# Patient Record
Sex: FEMALE | Race: OTHER | Employment: FULL TIME | URBAN - METROPOLITAN AREA
[De-identification: names, ages, dates, MRNs, and addresses within clinical notes are randomized per-mention and may not be internally consistent; named-entity substitution may affect disease eponyms.]

---

## 2019-05-05 ENCOUNTER — HOSPITAL ENCOUNTER (INPATIENT)
Age: 39
LOS: 3 days | Discharge: HOME | DRG: 744 | End: 2019-05-08
Attending: EMERGENCY MEDICINE | Admitting: INTERNAL MEDICINE

## 2019-05-05 DIAGNOSIS — D64.9 ANEMIA, UNSPECIFIED TYPE: Primary | ICD-10-CM

## 2019-05-05 DIAGNOSIS — N93.9 ABNORMAL UTERINE BLEEDING (AUB): ICD-10-CM

## 2019-05-05 DIAGNOSIS — D50.0 IRON DEFICIENCY ANEMIA DUE TO CHRONIC BLOOD LOSS: ICD-10-CM

## 2019-05-05 PROBLEM — N92.0 HEAVY MENSES: Status: ACTIVE | Noted: 2019-05-05

## 2019-05-05 PROBLEM — R73.9 HYPERGLYCEMIA: Status: ACTIVE | Noted: 2019-05-05

## 2019-05-05 PROBLEM — E66.01 MORBID OBESITY WITH BMI OF 50.0-59.9, ADULT (HCC): Status: ACTIVE | Noted: 2019-05-05

## 2019-05-05 LAB
-: ABNORMAL
ABSOLUTE EOS #: 0.28 K/UL (ref 0–0.4)
ABSOLUTE IMMATURE GRANULOCYTE: ABNORMAL K/UL (ref 0–0.3)
ABSOLUTE LYMPH #: 1.21 K/UL (ref 1–4.8)
ABSOLUTE MONO #: 0.47 K/UL (ref 0.1–1.3)
ALBUMIN SERPL-MCNC: 3.9 G/DL (ref 3.5–5.2)
ALBUMIN/GLOBULIN RATIO: ABNORMAL (ref 1–2.5)
ALP BLD-CCNC: 45 U/L (ref 35–104)
ALT SERPL-CCNC: 8 U/L (ref 5–33)
AMORPHOUS: ABNORMAL
ANION GAP SERPL CALCULATED.3IONS-SCNC: 13 MMOL/L (ref 9–17)
AST SERPL-CCNC: 13 U/L
BACTERIA: ABNORMAL
BASOPHILS # BLD: 1 % (ref 0–2)
BASOPHILS ABSOLUTE: 0.09 K/UL (ref 0–0.2)
BILIRUB SERPL-MCNC: 0.32 MG/DL (ref 0.3–1.2)
BILIRUBIN URINE: NEGATIVE
BUN BLDV-MCNC: 13 MG/DL (ref 6–20)
BUN/CREAT BLD: ABNORMAL (ref 9–20)
CALCIUM SERPL-MCNC: 8.3 MG/DL (ref 8.6–10.4)
CASTS UA: ABNORMAL /LPF
CHLORIDE BLD-SCNC: 100 MMOL/L (ref 98–107)
CO2: 24 MMOL/L (ref 20–31)
COLOR: ABNORMAL
COMMENT UA: ABNORMAL
CREAT SERPL-MCNC: 0.76 MG/DL (ref 0.5–0.9)
CRYSTALS, UA: ABNORMAL /HPF
DIFFERENTIAL TYPE: ABNORMAL
EOSINOPHILS RELATIVE PERCENT: 3 % (ref 0–4)
EPITHELIAL CELLS UA: ABNORMAL /HPF
ESTRADIOL LEVEL: 124 PG/ML (ref 27–314)
FERRITIN: 3 UG/L (ref 13–150)
FOLATE: 9.9 NG/ML
FOLLICLE STIMULATING HORMONE: 4.7 U/L (ref 1.7–21.5)
GFR AFRICAN AMERICAN: >60 ML/MIN
GFR NON-AFRICAN AMERICAN: >60 ML/MIN
GFR SERPL CREATININE-BSD FRML MDRD: ABNORMAL ML/MIN/{1.73_M2}
GFR SERPL CREATININE-BSD FRML MDRD: ABNORMAL ML/MIN/{1.73_M2}
GLUCOSE BLD-MCNC: 235 MG/DL (ref 70–99)
GLUCOSE URINE: NEGATIVE
HAPTOGLOBIN: 234 MG/DL (ref 30–200)
HCG QUALITATIVE: NEGATIVE
HCT VFR BLD CALC: 15.5 % (ref 36–46)
HCT VFR BLD CALC: 20.8 % (ref 36–46)
HEMOGLOBIN: 4.7 G/DL (ref 12–16)
HEMOGLOBIN: 6.2 G/DL (ref 12–16)
IGA: 79 MG/DL (ref 70–400)
IMMATURE GRANULOCYTES: ABNORMAL %
INR BLD: 1.1
IRON SATURATION: 4 % (ref 20–55)
IRON: 17 UG/DL (ref 37–145)
KETONES, URINE: NEGATIVE
LEUKOCYTE ESTERASE, URINE: ABNORMAL
LH: 5.7 U/L (ref 1–95.6)
LYMPHOCYTES # BLD: 13 % (ref 24–44)
MCH RBC QN AUTO: 18 PG (ref 26–34)
MCHC RBC AUTO-ENTMCNC: 30.3 G/DL (ref 31–37)
MCV RBC AUTO: 59.5 FL (ref 80–100)
MONOCYTES # BLD: 5 % (ref 1–7)
MORPHOLOGY: ABNORMAL
MUCUS: ABNORMAL
NITRITE, URINE: NEGATIVE
NRBC AUTOMATED: ABNORMAL PER 100 WBC
OTHER OBSERVATIONS UA: ABNORMAL
PDW BLD-RTO: 20.4 % (ref 11.5–14.9)
PH UA: 7.5 (ref 5–8)
PLATELET # BLD: 230 K/UL (ref 150–450)
PLATELET ESTIMATE: ABNORMAL
PMV BLD AUTO: 8.4 FL (ref 6–12)
POTASSIUM SERPL-SCNC: 4.2 MMOL/L (ref 3.7–5.3)
PROLACTIN: 17.67 UG/L (ref 4.79–23.3)
PROTEIN UA: ABNORMAL
PROTHROMBIN TIME: 14.7 SEC (ref 11.8–14.6)
RBC # BLD: 2.61 M/UL (ref 4–5.2)
RBC # BLD: ABNORMAL 10*6/UL
RBC UA: ABNORMAL /HPF
RENAL EPITHELIAL, UA: ABNORMAL /HPF
SEG NEUTROPHILS: 78 % (ref 36–66)
SEGMENTED NEUTROPHILS ABSOLUTE COUNT: 7.25 K/UL (ref 1.3–9.1)
SODIUM BLD-SCNC: 137 MMOL/L (ref 135–144)
SPECIFIC GRAVITY UA: 1.02 (ref 1–1.03)
T3 FREE: 2.27 PG/ML (ref 2.02–4.43)
TOTAL IRON BINDING CAPACITY: 406 UG/DL (ref 250–450)
TOTAL PROTEIN: 6.7 G/DL (ref 6.4–8.3)
TRICHOMONAS: ABNORMAL
TSH SERPL DL<=0.05 MIU/L-ACNC: 2.08 MIU/L (ref 0.3–5)
TURBIDITY: ABNORMAL
UNSATURATED IRON BINDING CAPACITY: 389 UG/DL (ref 112–347)
URINE HGB: ABNORMAL
UROBILINOGEN, URINE: NORMAL
VITAMIN B-12: 262 PG/ML (ref 232–1245)
WBC # BLD: 9.3 K/UL (ref 3.5–11)
WBC # BLD: ABNORMAL 10*3/UL
WBC UA: ABNORMAL /HPF
YEAST: ABNORMAL

## 2019-05-05 PROCEDURE — 84481 FREE ASSAY (FT-3): CPT

## 2019-05-05 PROCEDURE — 83036 HEMOGLOBIN GLYCOSYLATED A1C: CPT

## 2019-05-05 PROCEDURE — 82607 VITAMIN B-12: CPT

## 2019-05-05 PROCEDURE — 82728 ASSAY OF FERRITIN: CPT

## 2019-05-05 PROCEDURE — 99223 1ST HOSP IP/OBS HIGH 75: CPT | Performed by: INTERNAL MEDICINE

## 2019-05-05 PROCEDURE — P9016 RBC LEUKOCYTES REDUCED: HCPCS

## 2019-05-05 PROCEDURE — 81001 URINALYSIS AUTO W/SCOPE: CPT

## 2019-05-05 PROCEDURE — 85303 CLOT INHIBIT PROT C ACTIVITY: CPT

## 2019-05-05 PROCEDURE — 83002 ASSAY OF GONADOTROPIN (LH): CPT

## 2019-05-05 PROCEDURE — 80053 COMPREHEN METABOLIC PANEL: CPT

## 2019-05-05 PROCEDURE — 85025 COMPLETE CBC W/AUTO DIFF WBC: CPT

## 2019-05-05 PROCEDURE — 86920 COMPATIBILITY TEST SPIN: CPT

## 2019-05-05 PROCEDURE — 84146 ASSAY OF PROLACTIN: CPT

## 2019-05-05 PROCEDURE — 83010 ASSAY OF HAPTOGLOBIN QUANT: CPT

## 2019-05-05 PROCEDURE — 87591 N.GONORRHOEAE DNA AMP PROB: CPT

## 2019-05-05 PROCEDURE — 87086 URINE CULTURE/COLONY COUNT: CPT

## 2019-05-05 PROCEDURE — 86901 BLOOD TYPING SEROLOGIC RH(D): CPT

## 2019-05-05 PROCEDURE — 85610 PROTHROMBIN TIME: CPT

## 2019-05-05 PROCEDURE — 82746 ASSAY OF FOLIC ACID SERUM: CPT

## 2019-05-05 PROCEDURE — 83001 ASSAY OF GONADOTROPIN (FSH): CPT

## 2019-05-05 PROCEDURE — 87491 CHLMYD TRACH DNA AMP PROBE: CPT

## 2019-05-05 PROCEDURE — 36430 TRANSFUSION BLD/BLD COMPNT: CPT

## 2019-05-05 PROCEDURE — 83540 ASSAY OF IRON: CPT

## 2019-05-05 PROCEDURE — 85014 HEMATOCRIT: CPT

## 2019-05-05 PROCEDURE — 99285 EMERGENCY DEPT VISIT HI MDM: CPT

## 2019-05-05 PROCEDURE — 82784 ASSAY IGA/IGD/IGG/IGM EACH: CPT

## 2019-05-05 PROCEDURE — 85300 ANTITHROMBIN III ACTIVITY: CPT

## 2019-05-05 PROCEDURE — 84443 ASSAY THYROID STIM HORMONE: CPT

## 2019-05-05 PROCEDURE — 82670 ASSAY OF TOTAL ESTRADIOL: CPT

## 2019-05-05 PROCEDURE — 84703 CHORIONIC GONADOTROPIN ASSAY: CPT

## 2019-05-05 PROCEDURE — 83550 IRON BINDING TEST: CPT

## 2019-05-05 PROCEDURE — 86900 BLOOD TYPING SEROLOGIC ABO: CPT

## 2019-05-05 PROCEDURE — 83516 IMMUNOASSAY NONANTIBODY: CPT

## 2019-05-05 PROCEDURE — 85018 HEMOGLOBIN: CPT

## 2019-05-05 PROCEDURE — 36415 COLL VENOUS BLD VENIPUNCTURE: CPT

## 2019-05-05 PROCEDURE — 85306 CLOT INHIBIT PROT S FREE: CPT

## 2019-05-05 PROCEDURE — 2060000000 HC ICU INTERMEDIATE R&B

## 2019-05-05 PROCEDURE — 6370000000 HC RX 637 (ALT 250 FOR IP): Performed by: STUDENT IN AN ORGANIZED HEALTH CARE EDUCATION/TRAINING PROGRAM

## 2019-05-05 PROCEDURE — 2580000003 HC RX 258: Performed by: STUDENT IN AN ORGANIZED HEALTH CARE EDUCATION/TRAINING PROGRAM

## 2019-05-05 PROCEDURE — 86850 RBC ANTIBODY SCREEN: CPT

## 2019-05-05 RX ORDER — ONDANSETRON 2 MG/ML
4 INJECTION INTRAMUSCULAR; INTRAVENOUS EVERY 6 HOURS PRN
Status: DISCONTINUED | OUTPATIENT
Start: 2019-05-05 | End: 2019-05-08 | Stop reason: HOSPADM

## 2019-05-05 RX ORDER — SODIUM CHLORIDE 0.9 % (FLUSH) 0.9 %
10 SYRINGE (ML) INJECTION PRN
Status: DISCONTINUED | OUTPATIENT
Start: 2019-05-05 | End: 2019-05-08 | Stop reason: HOSPADM

## 2019-05-05 RX ORDER — SODIUM CHLORIDE 0.9 % (FLUSH) 0.9 %
10 SYRINGE (ML) INJECTION EVERY 12 HOURS SCHEDULED
Status: DISCONTINUED | OUTPATIENT
Start: 2019-05-05 | End: 2019-05-08 | Stop reason: HOSPADM

## 2019-05-05 RX ORDER — 0.9 % SODIUM CHLORIDE 0.9 %
250 INTRAVENOUS SOLUTION INTRAVENOUS ONCE
Status: DISCONTINUED | OUTPATIENT
Start: 2019-05-05 | End: 2019-05-08 | Stop reason: HOSPADM

## 2019-05-05 RX ORDER — POLYETHYLENE GLYCOL 3350 17 G/17G
17 POWDER, FOR SOLUTION ORAL DAILY PRN
Status: DISCONTINUED | OUTPATIENT
Start: 2019-05-05 | End: 2019-05-08 | Stop reason: HOSPADM

## 2019-05-05 RX ORDER — MAGNESIUM SULFATE 1 G/100ML
1 INJECTION INTRAVENOUS PRN
Status: DISCONTINUED | OUTPATIENT
Start: 2019-05-05 | End: 2019-05-08 | Stop reason: HOSPADM

## 2019-05-05 RX ORDER — POTASSIUM CHLORIDE 20 MEQ/1
40 TABLET, EXTENDED RELEASE ORAL PRN
Status: DISCONTINUED | OUTPATIENT
Start: 2019-05-05 | End: 2019-05-08 | Stop reason: HOSPADM

## 2019-05-05 RX ORDER — ACETAMINOPHEN 325 MG/1
650 TABLET ORAL EVERY 4 HOURS PRN
Status: DISCONTINUED | OUTPATIENT
Start: 2019-05-05 | End: 2019-05-08 | Stop reason: HOSPADM

## 2019-05-05 RX ORDER — FAMOTIDINE 20 MG/1
20 TABLET, FILM COATED ORAL 2 TIMES DAILY
Status: DISCONTINUED | OUTPATIENT
Start: 2019-05-05 | End: 2019-05-08 | Stop reason: HOSPADM

## 2019-05-05 RX ORDER — POTASSIUM CHLORIDE 7.45 MG/ML
10 INJECTION INTRAVENOUS PRN
Status: DISCONTINUED | OUTPATIENT
Start: 2019-05-05 | End: 2019-05-08 | Stop reason: HOSPADM

## 2019-05-05 RX ORDER — SODIUM CHLORIDE 9 MG/ML
INJECTION, SOLUTION INTRAVENOUS CONTINUOUS
Status: DISCONTINUED | OUTPATIENT
Start: 2019-05-05 | End: 2019-05-08

## 2019-05-05 RX ADMIN — ACETAMINOPHEN 650 MG: 325 TABLET, FILM COATED ORAL at 16:38

## 2019-05-05 RX ADMIN — SODIUM CHLORIDE: 9 INJECTION, SOLUTION INTRAVENOUS at 17:22

## 2019-05-05 RX ADMIN — FAMOTIDINE 20 MG: 20 TABLET ORAL at 16:38

## 2019-05-05 ASSESSMENT — PAIN SCALES - GENERAL
PAINLEVEL_OUTOF10: 1
PAINLEVEL_OUTOF10: 3

## 2019-05-05 ASSESSMENT — ENCOUNTER SYMPTOMS
CONSTIPATION: 0
DIARRHEA: 0
WHEEZING: 0
RESPIRATORY NEGATIVE: 1
EYES NEGATIVE: 1
GASTROINTESTINAL NEGATIVE: 1
NAUSEA: 0
ABDOMINAL PAIN: 0
SHORTNESS OF BREATH: 0
VOMITING: 0
BACK PAIN: 0
COUGH: 0

## 2019-05-05 NOTE — ED PROVIDER NOTES
EMERGENCY DEPARTMENT ENCOUNTER    Pt Name: Kathy Del Castillo  MRN: [id number]  [place] 1980  Date of evaluation: 5/5/19  CHIEF COMPLAINT       Chief Complaint   Patient presents with    Vaginal Bleeding     HISTORY OF PRESENT ILLNESS   The pt presents for evaluation of weakness, fatigue and shortness of breath. Started today. Pt states that she thinks she is a little pale. Pt states that she has been having irregular vaginal bleeding, heavy at times since Thursday. lmp was about a month ago. Pt has irregular periods. She does not see gyn. Pt denies any other bleeding, no changes in stool color or consistency. The history is provided by the patient. Fatigue   Severity:  Moderate  Onset quality:  Sudden  Duration:  1 day  Timing:  Constant  Progression:  Worsening  Chronicity:  New  Relieved by:  Nothing  Worsened by:  Nothing  Ineffective treatments:  None tried  Associated symptoms: no abdominal pain, no chest pain, no cough, no headaches and no shortness of breath        REVIEW OF SYSTEMS     Review of Systems   Constitutional: Positive for fatigue. HENT: Negative. Negative for congestion. Eyes: Negative. Respiratory: Negative. Negative for cough and shortness of breath. Cardiovascular: Negative. Negative for chest pain. Gastrointestinal: Negative. Negative for abdominal pain. Genitourinary: Positive for vaginal bleeding. Musculoskeletal: Negative. Negative for back pain. Skin: Positive for pallor. Neurological: Negative. Negative for headaches. All other systems reviewed and are negative. PASTMEDICAL HISTORY   History reviewed. No pertinent past medical history.   SURGICAL HISTORY       Past Surgical History:   Procedure Laterality Date    DILATION AND CURETTAGE OF UTERUS      x2     CURRENT MEDICATIONS       Current Discharge Medication List      CONTINUE these medications which have NOT CHANGED    Details   cephALEXin (KEFLEX) 500 MG capsule Take 1 capsule by mouth 3 times daily. Qty: 21 capsule, Refills: 0           ALLERGIES     has No Known Allergies. FAMILY HISTORY     has no family status information on file. SOCIAL HISTORY       Social History     Tobacco Use    Smoking status: Never Smoker    Smokeless tobacco: Never Used   Substance Use Topics    Alcohol use: Yes     Comment: occasional    Drug use: No     PHYSICAL EXAM     INITIAL VITALS: /60   Pulse 89   Temp 98 °F (36.7 °C) (Oral)   Resp 18   Ht 5' 2\" (1.575 m)   Wt 283 lb (128.4 kg)   LMP 05/01/2019   SpO2 100%   BMI 51.76 kg/m²    Physical Exam   Constitutional: She is oriented to person, place, and time. No distress. HENT:   Head: Normocephalic and atraumatic. Eyes: Conjunctivae are normal.   Neck: Normal range of motion. Neck supple. Cardiovascular: Normal rate, regular rhythm and normal heart sounds. No murmur heard. Pulmonary/Chest: Effort normal and breath sounds normal.   Abdominal: Soft. There is no tenderness. Genitourinary:   Genitourinary Comments: Chaperoned exam, scant blood no clots in vaginal canal.  Otherwise no abnormalities noted. Musculoskeletal: She exhibits no deformity. Neurological: She is alert and oriented to person, place, and time. Skin: Skin is warm and dry. She is not diaphoretic. There is pallor. Nursing note and vitals reviewed. MEDICAL DECISION MAKING:   Reviewed results. Significant symptomatic anemia. Type and cross of transfusion. On reeval, exam unchanged. Updated pt regarding results. Discussed with medicine and ob, will admit for further therapy and evaluation. Pt is ready for admission at this time.          CRITICAL CARE:       PROCEDURES:    Procedures    DIAGNOSTIC RESULTS   EKG:All EKG's are interpreted by the Emergency Department Physician who either signs or Co-signs this chart in the absence of a cardiologist.        RADIOLOGY:All plain film, CT, MRI, and formal ultrasound images (except ED bedside ultrasound) are read by the radiologist, see reports below, unless otherwisenoted in MDM or here. US NON OB TRANSVAGINAL    (Results Pending)     LABS: All lab results were reviewed by myself, and all abnormals are listed below.   Labs Reviewed   CBC WITH AUTO DIFFERENTIAL - Abnormal; Notable for the following components:       Result Value    RBC 2.61 (*)     Hemoglobin 4.7 (*)     Hematocrit 15.5 (*)     MCV 59.5 (*)     MCH 18.0 (*)     MCHC 30.3 (*)     RDW 20.4 (*)     Seg Neutrophils 78 (*)     Lymphocytes 13 (*)     All other components within normal limits   COMPREHENSIVE METABOLIC PANEL - Abnormal; Notable for the following components:    Glucose 235 (*)     Calcium 8.3 (*)     All other components within normal limits   PROTIME-INR - Abnormal; Notable for the following components:    Protime 14.7 (*)     All other components within normal limits   HCG, SERUM, QUALITATIVE   PERIPHERAL BLOOD SMEAR, PATH REVIEW   TSH WITHOUT REFLEX   HEMOGLOBIN AND HEMATOCRIT, BLOOD   ANDROSTENEDIONE   [id number] ACTIVITY   ANTITHROMBIN III ACTIVITY   FACTOR 8 RISTOCETIN COFACTOR   PROTEIN S FUNCTIONAL   PROTEIN C FUNCTIONAL   ESTROGENS, FRACTIONATED   ESTRADIOL   IRON AND TIBC   FERRITIN   FOLLICLE STIMULATING HORMONE   T3, FREE   HEMOGLOBIN A1C   LUTEINIZING HORMONE   VITAMIN B12 & FOLATE   GLIADIN DEAMINIDATED PEPTIDE AB IGA   GLIADIN DEAMINIDATED PEPTIDE AB IGG   IGA   PROLACTIN   TTIGA WITH REFLEX TO AEMAT   TISSUE TRANSGLUTAMINASE, IGG   HAPTOGLOBIN   TYPE AND SCREEN   TYPE AND SCREEN   PREPARE RBC (CROSSMATCH)       EMERGENCY DEPARTMENTCOURSE:         Vitals:    Vitals:    05/05/19 1522 05/05/19 1535 05/05/19 1538 05/05/19 1550   BP: (!) 140/54 (!) 117/56 (!) 117/56 135/60   Pulse: 91 93 93 89   Resp: 18 16  18   Temp: 97.8 °F (36.6 °C) 98 °F (36.7 °C)  98 °F (36.7 °C)   TempSrc: Oral Oral  Oral   SpO2: 100% 100%  100%   Weight:       Height:           The patient was given the following medications while in the emergency department:  Orders Placed This Encounter   Medications    0.9 % sodium chloride bolus    sodium chloride flush 0.9 % injection 10 mL    sodium chloride flush 0.9 % injection 10 mL    ondansetron (ZOFRAN) injection 4 mg    0.9 % sodium chloride infusion    OR Linked Order Group     potassium chloride (KLOR-CON M) extended release tablet 40 mEq     potassium bicarb-citric acid (EFFER-K) effervescent tablet 40 mEq     potassium chloride 10 mEq/100 mL IVPB (Peripheral Line)    magnesium sulfate 1 g in dextrose 5% 100 mL IVPB    polyethylene glycol (GLYCOLAX) packet 17 g    famotidine (PEPCID) tablet 20 mg    acetaminophen (TYLENOL) tablet 650 mg    iron sucrose (VENOFER) 200 mg in sodium chloride 0.9 % 100 mL IVPB     CONSULTS:  IP CONSULT TO OB GYN  IP CONSULT TO INTERNAL MEDICINE  IP CONSULT TO SOCIAL WORK  IP CONSULT TO ONCOLOGY    FINAL IMPRESSION      1.  Anemia, unspecified type          DISPOSITION/PLAN   DISPOSITION Admitted 05/05/2019 01:04:57 PM      PATIENT REFERRED TO:  Mike Mccord MD  [address] [place] [address]  [phone]    In 1 week  post hospital admission    [address] [place] [address]  [phone]  In 1 week  F/U visit following hospital admission for AUB    DISCHARGE MEDICATIONS:  Current Discharge Medication List        Shaila Loco MD  Attending Emergency Physician                    Murtaza Flower MD  05/05/19 2832

## 2019-05-05 NOTE — H&P
History and Physical Service  [facility] Internal Medicine    HISTORY AND PHYSICAL EXAMINATION            Date:   5/5/2019  Patient name:  Lucia Grier  MRN:   [id number]  Account:  [de-identified]  YOB: 1980  PCP:    No primary care provider on file. Code Status:    Full Code    Chief Complaint:     Fatigue and dyspnea    History Obtained From:     patient    History of Present Illness: The patient is a 41 y.o. Non-/non  female who presents with the increasing fatigue and dyspnea for last few days denies any chest pain positive for increased menstrual bleed 3 days. The first days heavy with the 12 pads second and third days 3 parents denied any lower GI bleed no new medications no travel history   No associated  diarrhea or weight loss         Past Medical History:     History reviewed. No pertinent past medical history. Past Surgical History:     Past Surgical History:   Procedure Laterality Date    DILATION AND CURETTAGE OF UTERUS      x2        Medications Prior to Admission:     Prior to Admission medications    Medication Sig Start Date End Date Taking? Authorizing Provider   cephALEXin (KEFLEX) 500 MG capsule Take 1 capsule by mouth 3 times daily. 12/2/14   Altagracia Leblanc MD        Allergies:     Patient has no known allergies. Social History:     Tobacco:    reports that she has never smoked. She has never used smokeless tobacco.  Alcohol:      reports that she drinks alcohol. Drug Use:  reports that she does not use drugs. Family History:     History reviewed. No pertinent family history. Review of Systems:     Positive and Negative as described in HPI.     CONSTITUTIONAL:  negative for fevers, chills, sweats, fatigue, weight loss  HEENT:  negative for vision, hearing changes, runny nose, throat pain  RESPIRATORY: Dyspnea on exertion  CARDIOVASCULAR:  T and dyspnea on exertion  GASTROINTESTINAL:  negative for nausea, vomiting, diarrhea, constipation, change in bowel habits, abdominal pain   GENITOURINARY:  negative for difficulty of urination, burning with urination, frequency   INTEGUMENT:  negative for rash, skin lesions, easy bruising   HEMATOLOGIC/LYMPHATIC:  negative for swelling/edema   ALLERGIC/IMMUNOLOGIC:  negative for urticaria , itching  ENDOCRINE:  negative increase in drinking, increase in urination, hot or cold intolerance  MUSCULOSKELETAL:  negative joint pains, muscle aches, swelling of joints  NEUROLOGICAL:  negative for headaches, dizziness, lightheadedness, numbness, pain, tingling extremities  BEHAVIOR/PSYCH:  negative for depression, anxiety  Positive for heavy menstrual bleed  Physical Exam:   BP (!) 146/63   Pulse 91   Temp 97.6 °F (36.4 °C) (Oral)   Resp 18   Ht 5' 2\" (1.575 m)   Wt 283 lb (128.4 kg)   LMP 05/01/2019   SpO2 100%   BMI 51.76 kg/m²   No results for input(s): POCGLU in the last 72 hours. Morbid obese clinically looks pale  General Appearance:  alert, well appearing, and in no acute distress  Mental status: oriented to person, place, and time with normal affect  Head:  normocephalic, atraumatic. Eye: no icterus, redness, pupils equal and reactive, extraocular eye movements intact, conjunctiva clear  Ear: normal external ear, no discharge, hearing intact  Nose:  no drainage noted  Mouth: mucous membranes moist  Neck: supple, no carotid bruits, thyroid not palpable  Lungs: Bilateral equal air entry, clear to ausculation, no wheezing, rales or rhonchi, normal effort  Cardiovascular: normal rate, regular rhythm, no murmur, gallop, rub.   Abdomen: Soft, nontender, nondistended, normal bowel sounds, no hepatomegaly or splenomegaly  Neurologic: There are no new focal motor or sensory deficits, normal muscle tone and bulk, no abnormal sensation, normal speech, cranial nerves II through XII grossly intact  Skin: No gross lesions, rashes, bruising or bleeding on exposed skin area  Extremities:  peripheral pulses palpable, no pedal edema or calf pain with palpation  Psych: normal affect     Investigations:      Laboratory Testing:  Recent Results (from the past 24 hour(s))   CBC Auto Differential    Collection Time: 05/05/19 11:39 AM   Result Value Ref Range    WBC 9.3 3.5 - 11.0 k/uL    RBC 2.61 (L) 4.0 - 5.2 m/uL    Hemoglobin 4.7 (LL) 12.0 - 16.0 g/dL    Hematocrit 15.5 (L) 36 - 46 %    MCV 59.5 (L) 80 - 100 fL    MCH 18.0 (L) 26 - 34 pg    MCHC 30.3 (L) 31 - 37 g/dL    RDW 20.4 (H) 11.5 - 14.9 %    Platelets 967 750 - 541 k/uL    MPV 8.4 6.0 - 12.0 fL    NRBC Automated NOT REPORTED per 100 WBC    Differential Type NOT REPORTED     Immature Granulocytes NOT REPORTED 0 %    Absolute Immature Granulocyte NOT REPORTED 0.00 - 0.30 k/uL    WBC Morphology NOT REPORTED     RBC Morphology NOT REPORTED     Platelet Estimate NOT REPORTED     Seg Neutrophils 78 (H) 36 - 66 %    Lymphocytes 13 (L) 24 - 44 %    Monocytes 5 1 - 7 %    Eosinophils % 3 0 - 4 %    Basophils 1 0 - 2 %    Segs Absolute 7.25 1.3 - 9.1 k/uL    Absolute Lymph # 1.21 1.0 - 4.8 k/uL    Absolute Mono # 0.47 0.1 - 1.3 k/uL    Absolute Eos # 0.28 0.0 - 0.4 k/uL    Basophils # 0.09 0.0 - 0.2 k/uL    Morphology ANISOCYTOSIS PRESENT     Morphology HYPOCHROMIA PRESENT     Morphology MICROCYTOSIS PRESENT     Morphology 1+ ELLIPTOCYTES     Morphology 1+ POLYCHROMASIA    Comprehensive Metabolic Panel    Collection Time: 05/05/19 11:39 AM   Result Value Ref Range    Glucose 235 (H) 70 - 99 mg/dL    BUN 13 6 - 20 mg/dL    CREATININE 0.76 0.50 - 0.90 mg/dL    Bun/Cre Ratio NOT REPORTED 9 - 20    Calcium 8.3 (L) 8.6 - 10.4 mg/dL    Sodium 137 135 - 144 mmol/L    Potassium 4.2 3.7 - 5.3 mmol/L    Chloride 100 98 - 107 mmol/L    CO2 24 20 - 31 mmol/L    Anion Gap 13 9 - 17 mmol/L    Alkaline Phosphatase 45 35 - 104 U/L    ALT 8 5 - 33 U/L    AST 13 <32 U/L    Total Bilirubin 0.32 0.3 - 1.2 mg/dL    Total Protein 6.7 6.4 - 8.3 g/dL    Alb 3.9 3.5 - 5.2 g/dL    Albumin/Globulin Ratio NOT REPORTED 1.0 - 2.5    GFR Non-African American >60 >60 mL/min    GFR African American >60 >60 mL/min    GFR Comment          GFR Staging NOT REPORTED    HCG Qualitative, Serum    Collection Time: 05/05/19 11:39 AM   Result Value Ref Range    hCG Qual NEGATIVE NEGATIVE   TYPE AND SCREEN    Collection Time: 05/05/19 11:39 AM   Result Value Ref Range    Expiration Date 05/08/2019     Arm Band Number [id number]     ABO/Rh A POSITIVE     Antibody Screen NEGATIVE     Blood Bank Comment ABORH CONFIRMED AS A POS: 403  Results Verified       Unit Number [id number]     Product Code Leukocyte Reduced Red Cell     Unit Divison 00     Dispense Status ALLOCATED     Transfusion Status OK TO TRANSFUSE     Crossmatch Result COMPATIBLE     Unit Number [id number]     Product Code Leukocyte Reduced Red Cell     Unit Divison 00     Dispense Status ISSUED     Transfusion Status OK TO TRANSFUSE     Crossmatch Result COMPATIBLE    Protime-INR    Collection Time: 05/05/19 11:39 AM   Result Value Ref Range    Protime 14.7 (H) 11.8 - 14.6 sec    INR 1.1    Path Review, Smear    Collection Time: 05/05/19 11:39 AM   Result Value Ref Range    Pathologist Review TO BE REVIEWED BY PATHOLOGIST        Recent Labs     05/05/19  1139   HGB 4.7*   HCT 15.5*   WBC 9.3   MCV 59.5*      K 4.2      CO2 24   BUN 13   CREATININE 0.76   GLUCOSE 235*   INR 1.1   PROTIME 14.7*   AST 13   ALT 8   LABALBU 3.9       Hematology:  Recent Labs     05/05/19  1139   WBC 9.3   RBC 2.61*   HGB 4.7*   HCT 15.5*   MCV 59.5*   MCH 18.0*   MCHC 30.3*   RDW 20.4*      MPV 8.4   INR 1.1     Chemistry:  Recent Labs     05/05/19  1139      K 4.2      CO2 24   GLUCOSE 235*   BUN 13   CREATININE 0.76   ANIONGAP 13   LABGLOM >60   GFRAA >60   CALCIUM 8.3*     Recent Labs     05/05/19  1139   PROT 6.7   LABALBU 3.9   AST 13   ALT 8   ALKPHOS 45   BILITOT 0.32       Imaging/Diagnostics:       No results found.          Impressions :      1. Active Problems:    Anemia    Morbid obesity with BMI of 50.0-59.9, adult (HCC)    Abnormal uterine bleeding (AUB)    Heavy menses    Hyperglycemia  Resolved Problems:    * No resolved hospital problems. *        2.  has no past medical history on file.      Plans:     Severe microcytic anemia with MCV of 59 likely for menorrhagia  Will do iron deficiency workup will do a celiac panel hemolytic anemia workup ObGyn consult  IV Venofer 200 mg once a day for 5 doses      Current Facility-Administered Medications   Medication Dose Route Frequency Provider Last Rate Last Dose    0.9 % sodium chloride bolus  250 mL Intravenous Once Ilda George MD        sodium chloride flush 0.9 % injection 10 mL  10 mL Intravenous 2 times per day Fabiano Daniels MD        sodium chloride flush 0.9 % injection 10 mL  10 mL Intravenous PRN Fabiano Daniels MD        ondansetron (ZOFRAN) injection 4 mg  4 mg Intravenous Q6H PRN Fabiano Daniels MD        0.9 % sodium chloride infusion   Intravenous Continuous Fabiano Daniels MD        potassium chloride (KLOR-CON M) extended release tablet 40 mEq  40 mEq Oral PRN Fabiano Daniels MD        Or    potassium bicarb-citric acid (EFFER-K) effervescent tablet 40 mEq  40 mEq Oral PRN Fbaiano Daniels MD        Or    potassium chloride 10 mEq/100 mL IVPB (Peripheral Line)  10 mEq Intravenous PRN Fabiano Daniels MD        magnesium sulfate 1 g in dextrose 5% 100 mL IVPB  1 g Intravenous PRN Fabiano Daniels MD        polyethylene glycol (GLYCOLAX) packet 17 g  17 g Oral Daily PRN Fabiano Daniels MD        famotidine (PEPCID) tablet 20 mg  20 mg Oral BID Fabiano Daniels MD        acetaminophen (TYLENOL) tablet 650 mg  650 mg Oral Q4H PRN Fabiano Daniels MD        iron sucrose (VENOFER) 200 mg in sodium chloride 0.9 % 100 mL IVPB  200 mg Intravenous Q24H MD Jackson Randall MD  5/5/2019  2:52 PM

## 2019-05-05 NOTE — LETTER
[address]  Phone: [phone]    Dr. Deep Loaiza      May 8, 2019     Patient: Royer Landry   YOB: 1980   Date of Visit: 5/5/2019       To Whom It May Concern:    Mag Van was admitted to the hospital from 5/5/2019 to 5/8/2019 to treat her medical condition. It is my medical opinion that Mag Van may return to full duty immediately with no restrictions. If you have any questions or concerns, please don't hesitate to call.     Sincerely,          Electronically signed by Deep Loaiza MD on 5/8/2019 at 1:52 PM

## 2019-05-05 NOTE — PROGRESS NOTES
Tan Fracisco   1980    Hgb 6.2 s/p 2u PRBC. Additional 1u of PRBC ordered.     Electronically signed by Yaquelin Ortiz MD on 2019 at 6:46 PM

## 2019-05-05 NOTE — PROGRESS NOTES
Patient to 2088 from ED. Patient attached to portable cardiac monitor. Assessment complete, vital signs obtained. First unit of PRBCs currently infusing into L AC IV. Patient oriented to surroundings and use of call system. No signs of bleeding currently. Patient alert/oriented and appropriate. Updated on plan of care. Denies further questions at this time.

## 2019-05-05 NOTE — CONSULTS
[address]    Patient Name: Tip Germain     Patient : 1980  Room/Bed:   Admission Date/Time: 2019 11:11 AM  Primary Care Physician: No primary care provider on file. Consulting Provider: Dr. Indu Green  Reason for Consult: Abnormal Uterine    CC:   Chief Complaint   Patient presents with    Vaginal Bleeding                HPI: Tip Germain is a 41 y.o. female [id number] presents with lightheadedness, dizziness and vaginal bleeding earlier today. Reports felt she had to use the restroom to void, went to the restroom and had active vaginal bleeding that \"filled the whole toilet\" around 9:00 am. She reports Hx of menses at age 13 with irregular menses at first, Hx of TAB x2 requiring D&C and regular menses since birth of her son at age 25. Reports normal  with no issues or bleeding problems. She reports regular menses every month that last 2-3 days with heavy bleeding for \"about half a day\" on day one of menses. She has always had heavy menses. Denies any previous symptoms or workup previously. She has no regular PCP or GYN care. She reports seeing a doctor \"for a pap smear 3 or 4 years ago\" and reports it was negative because she never got a call otherwise. Patient's last menstrual period was 2019. Hgb 4.7 on admission with no active bleeding on pelvic exam per ED attending. She was very pale (\"as white as paper\" on admission (per ED attending) and so lightheaded that she was unable to tolerate orthostatics. Patient admitted to Medicine for acute anemia workup and treatment. OB consulted for AUB. Patient receiving unit of blood when evaluated by myself. Pelvic exam performed revealing enlarged fibroid uterus with no active bleeding on my exam and no blood on gloves. Patient able to sit up, remove underwear for pelvic exam, reporting feeling better since start of blood transfusion.     She denies PMHx of DM, Thyroid issues or family hx of Medications   Medication Dose Route Frequency Provider Last Rate Last Dose    0.9 % sodium chloride bolus  250 mL Intravenous Once Fabio New MD         Current Outpatient Medications   Medication Sig Dispense Refill    cephALEXin (KEFLEX) 500 MG capsule Take 1 capsule by mouth 3 times daily. 21 capsule 0       FAMILY HISTORY:  Family History of Breast, Ovarian, Colon or Uterine Cancer: No   family history is not on file. SOCIAL HISTORY:   reports that she has never smoked. She has never used smokeless tobacco. She reports that she drinks alcohol. She reports that she does not use drugs. HEALTH MAINTENANCE:  Date of Last Mammogram: Patient has not had mammogram  Date of Last Colonoscopy: Patient has not had  Date of Last Bone Density: Patient has not had  ________________________________________________________________________                                    VITALS:  Vitals:    05/05/19 1301 05/05/19 1306 05/05/19 1311 05/05/19 1326   BP: (!) 118/53 (!) 112/51 (!) 117/52 (!) 113/53   Pulse: 94 91 94 89   Resp: 16 16 16 18   Temp: 98.4 °F (36.9 °C) 98.4 °F (36.9 °C) 98.4 °F (36.9 °C) 98.6 °F (37 °C)   TempSrc: Oral Oral Oral Oral   SpO2: 100% 100% 100% 100%   Weight:       Height:                                                        INPUT/OUTPUT:  No intake/output data recorded. No intake/output data recorded. PHYSICAL EXAM:     General Appearance: Appears pale, morbidly obese, Alert; in no acute distress. Pleasant. Skin: Skin color pale, normal texture. No rashes or lesions. Conjunctival pallor and pale hands  Lymphatic: No abnormally enlarged lymph nodes. Neck and EENT: normal atraumatic, no neck masses, normal thyroid, no jvd  Respiratory: Normal expansion. Clear to auscultation. No rales, rhonchi, or wheezing.  Speaking in full sentences  Cardiovascular: regular rate and rhythm, no murmurs rubs or gallops, prolonged capillary refill >2 sec  Abdomen: obese, soft, non-tender, non-distended, no right upper quadrant tenderness and no CVA tenderness, no abdominal scars  Pelvic Exam:   External genitalia: General appearance; normal, Hair distribution; normal, Lesions absent  Urinary system: urethral meatus normal  Vaginal: normal mucosa, no discharge or bleeding at introitus  Cervix: normal appearing cervix without discharge or lesions  Adnexa: adnexa not palpable secondary to body habitus, nontender and no masses  Uterus: mid-position, enlarged 16-18 wk sized uterus, suspicious for fibroids  Rectal Exam: exam declined by patient  Musculoskeletal: Spine range of motion normal. Muscular strength intact. , Range of motion normal in hips, knees, shoulders, and spine., No joint swelling, deformity, or tenderness. , no gross abnormalities  Extremities: Warm and well perfused, non-tender BLE and non-edematous,   Psych:  oriented to time, place and person, mood and affect are within normal limits, pt is a good historian; no memory problems were noted     OMM EXAM:  The patient did not complain of a Chief complaint requiring OMM.   Chief Complaint: none    Structural Exam: No Interest    LAB RESULTS:  Results for orders placed or performed during the hospital encounter of 05/05/19   CBC Auto Differential   Result Value Ref Range    WBC 9.3 3.5 - 11.0 k/uL    RBC 2.61 (L) 4.0 - 5.2 m/uL    Hemoglobin 4.7 (LL) 12.0 - 16.0 g/dL    Hematocrit 15.5 (L) 36 - 46 %    MCV 59.5 (L) 80 - 100 fL    MCH 18.0 (L) 26 - 34 pg    MCHC 30.3 (L) 31 - 37 g/dL    RDW 20.4 (H) 11.5 - 14.9 %    Platelets 290 519 - 154 k/uL    MPV 8.4 6.0 - 12.0 fL    NRBC Automated NOT REPORTED per 100 WBC    Differential Type NOT REPORTED     Immature Granulocytes NOT REPORTED 0 %    Absolute Immature Granulocyte NOT REPORTED 0.00 - 0.30 k/uL    WBC Morphology NOT REPORTED     RBC Morphology NOT REPORTED     Platelet Estimate NOT REPORTED     Seg Neutrophils 78 (H) 36 - 66 %    Lymphocytes 13 (L) 24 - 44 %    Monocytes 5 1 - 7 %    Eosinophils % 3 0 - 4 %    Basophils 1 0 - 2 %    Segs Absolute 7.25 1.3 - 9.1 k/uL    Absolute Lymph # 1.21 1.0 - 4.8 k/uL    Absolute Mono # 0.47 0.1 - 1.3 k/uL    Absolute Eos # 0.28 0.0 - 0.4 k/uL    Basophils # 0.09 0.0 - 0.2 k/uL    Morphology ANISOCYTOSIS PRESENT     Morphology HYPOCHROMIA PRESENT     Morphology MICROCYTOSIS PRESENT     Morphology 1+ ELLIPTOCYTES     Morphology 1+ POLYCHROMASIA    Comprehensive Metabolic Panel   Result Value Ref Range    Glucose 235 (H) 70 - 99 mg/dL    BUN 13 6 - 20 mg/dL    CREATININE 0.76 0.50 - 0.90 mg/dL    Bun/Cre Ratio NOT REPORTED 9 - 20    Calcium 8.3 (L) 8.6 - 10.4 mg/dL    Sodium 137 135 - 144 mmol/L    Potassium 4.2 3.7 - 5.3 mmol/L    Chloride 100 98 - 107 mmol/L    CO2 24 20 - 31 mmol/L    Anion Gap 13 9 - 17 mmol/L    Alkaline Phosphatase 45 35 - 104 U/L    ALT 8 5 - 33 U/L    AST 13 <32 U/L    Total Bilirubin 0.32 0.3 - 1.2 mg/dL    Total Protein 6.7 6.4 - 8.3 g/dL    Alb 3.9 3.5 - 5.2 g/dL    Albumin/Globulin Ratio NOT REPORTED 1.0 - 2.5    GFR Non-African American >60 >60 mL/min    GFR African American >60 >60 mL/min    GFR Comment          GFR Staging NOT REPORTED    HCG Qualitative, Serum   Result Value Ref Range    hCG Qual NEGATIVE NEGATIVE   Protime-INR   Result Value Ref Range    Protime 14.7 (H) 11.8 - 14.6 sec    INR 1.1    Path Review, Smear   Result Value Ref Range    Pathologist Review TO BE REVIEWED BY PATHOLOGIST    TYPE AND SCREEN   Result Value Ref Range    Expiration Date 05/08/2019     Arm Band Number [id number]     ABO/Rh A POSITIVE     Antibody Screen NEGATIVE     Blood Bank Comment ABORH CONFIRMED AS A POS: 403  Results Verified       Unit Number [id number]     Product Code Leukocyte Reduced Red Cell     Unit Divison 00     Dispense Status ALLOCATED     Transfusion Status OK TO TRANSFUSE     Crossmatch Result COMPATIBLE     Unit Number [id number]     Product Code Leukocyte Reduced Red Cell     Unit Divison 00     Dispense Status ISSUED     Transfusion Status OK TO TRANSFUSE     Crossmatch Result COMPATIBLE        DIAGNOSTICS:    No results found. ASSESSMENT & PLAN:    Parth Byrne is a 41 y.o. female [id number] with AUB and acute on chronic blood loss anemia  - VSS  - Hgb 4.7 on admission  - Admitted to Medicine with OB on as consult  - No bleeding on pelvic exam per ED attending, no blood on glove on bimanual exam  - Blood transfusion initiated  - Labs per primary, agree with current ordered  - TVUS ordered to evaluate enlarged uterus    Enlarged uterus  - TVUS ordered    Hyperglycemia  - Glucose 235 on admission  - HgbA1C ordered    Morbid obesity (BMI 51)    Patient admitted to Medicine for acute anemia workup and treatment. OB consulted for AUB. Patient receiving unit of blood when evaluated by myself. Pelvic exam performed revealing enlarged fibroid uterus. Patient able to sit up, remove underwear for pelvic exam, reporting feeling better since start of blood transfusion. Patient will need to follow up closely with GYN for AUB and potential medical versus surgical management        Patient Active Problem List    Diagnosis Date Noted    Anemia 05/05/2019       Plan discussed with Dr. Aimee Martinez, who is agreeable.      Attending's Name: Dr. Zelda Hernández DO  Ob/Gyn Resident  Pager: [phone]  5/5/2019, 1:32 PM

## 2019-05-06 ENCOUNTER — APPOINTMENT (OUTPATIENT)
Dept: ULTRASOUND IMAGING | Age: 39
DRG: 744 | End: 2019-05-06

## 2019-05-06 LAB
ABSOLUTE EOS #: 0.29 K/UL (ref 0–0.4)
ABSOLUTE IMMATURE GRANULOCYTE: ABNORMAL K/UL (ref 0–0.3)
ABSOLUTE LYMPH #: 2.25 K/UL (ref 1–4.8)
ABSOLUTE MONO #: 0.59 K/UL (ref 0.1–1.3)
ANION GAP SERPL CALCULATED.3IONS-SCNC: 11 MMOL/L (ref 9–17)
BASOPHILS # BLD: 1 % (ref 0–2)
BASOPHILS ABSOLUTE: 0.1 K/UL (ref 0–0.2)
BUN BLDV-MCNC: 9 MG/DL (ref 6–20)
BUN/CREAT BLD: ABNORMAL (ref 9–20)
C. TRACHOMATIS DNA ,URINE: NEGATIVE
CALCIUM SERPL-MCNC: 8.4 MG/DL (ref 8.6–10.4)
CHLORIDE BLD-SCNC: 104 MMOL/L (ref 98–107)
CO2: 24 MMOL/L (ref 20–31)
CREAT SERPL-MCNC: 0.81 MG/DL (ref 0.5–0.9)
CULTURE: NORMAL
DIFFERENTIAL TYPE: ABNORMAL
EOSINOPHILS RELATIVE PERCENT: 3 % (ref 0–4)
ESTIMATED AVERAGE GLUCOSE: 154 MG/DL
GFR AFRICAN AMERICAN: >60 ML/MIN
GFR NON-AFRICAN AMERICAN: >60 ML/MIN
GFR SERPL CREATININE-BSD FRML MDRD: ABNORMAL ML/MIN/{1.73_M2}
GFR SERPL CREATININE-BSD FRML MDRD: ABNORMAL ML/MIN/{1.73_M2}
GLIADIN DEAMINIDATED PEPTIDE AB IGA: 0.5 U/ML
GLIADIN DEAMINIDATED PEPTIDE AB IGG: <0.4 U/ML
GLUCOSE BLD-MCNC: 135 MG/DL (ref 70–99)
HBA1C MFR BLD: 7 % (ref 4–6)
HCT VFR BLD CALC: 22.5 % (ref 36–46)
HCT VFR BLD CALC: 25 % (ref 36–46)
HCT VFR BLD CALC: 25.5 % (ref 36–46)
HEMOGLOBIN: 6.9 G/DL (ref 12–16)
HEMOGLOBIN: 7.6 G/DL (ref 12–16)
HEMOGLOBIN: 8.1 G/DL (ref 12–16)
IMMATURE GRANULOCYTES: ABNORMAL %
LYMPHOCYTES # BLD: 23 % (ref 24–44)
Lab: NORMAL
MCH RBC QN AUTO: 22 PG (ref 26–34)
MCHC RBC AUTO-ENTMCNC: 31.6 G/DL (ref 31–37)
MCV RBC AUTO: 69.4 FL (ref 80–100)
MONOCYTES # BLD: 6 % (ref 1–7)
MORPHOLOGY: ABNORMAL
N. GONORRHOEAE DNA, URINE: NEGATIVE
NRBC AUTOMATED: ABNORMAL PER 100 WBC
PATHOLOGIST REVIEW: NORMAL
PDW BLD-RTO: 28 % (ref 11.5–14.9)
PLATELET # BLD: 194 K/UL (ref 150–450)
PLATELET ESTIMATE: ABNORMAL
PMV BLD AUTO: 8.5 FL (ref 6–12)
POTASSIUM SERPL-SCNC: 4.5 MMOL/L (ref 3.7–5.3)
RBC # BLD: 3.67 M/UL (ref 4–5.2)
RBC # BLD: ABNORMAL 10*6/UL
SEG NEUTROPHILS: 67 % (ref 36–66)
SEGMENTED NEUTROPHILS ABSOLUTE COUNT: 6.57 K/UL (ref 1.3–9.1)
SODIUM BLD-SCNC: 139 MMOL/L (ref 135–144)
SPECIMEN DESCRIPTION: NORMAL
SPECIMEN DESCRIPTION: NORMAL
THYROXINE, FREE: 1.14 NG/DL (ref 0.93–1.7)
TISSUE TRANSGLUTAMINASE ANTIBODY IGG: <0.6 U/ML
TISSUE TRANSGLUTAMINASE IGA: 0.1 U/ML
WBC # BLD: 9.8 K/UL (ref 3.5–11)
WBC # BLD: ABNORMAL 10*3/UL

## 2019-05-06 PROCEDURE — 86900 BLOOD TYPING SEROLOGIC ABO: CPT

## 2019-05-06 PROCEDURE — 86038 ANTINUCLEAR ANTIBODIES: CPT

## 2019-05-06 PROCEDURE — 6370000000 HC RX 637 (ALT 250 FOR IP): Performed by: STUDENT IN AN ORGANIZED HEALTH CARE EDUCATION/TRAINING PROGRAM

## 2019-05-06 PROCEDURE — P9016 RBC LEUKOCYTES REDUCED: HCPCS

## 2019-05-06 PROCEDURE — 36430 TRANSFUSION BLD/BLD COMPNT: CPT

## 2019-05-06 PROCEDURE — 82157 ASSAY OF ANDROSTENEDIONE: CPT

## 2019-05-06 PROCEDURE — 36415 COLL VENOUS BLD VENIPUNCTURE: CPT

## 2019-05-06 PROCEDURE — 85018 HEMOGLOBIN: CPT

## 2019-05-06 PROCEDURE — 80048 BASIC METABOLIC PNL TOTAL CA: CPT

## 2019-05-06 PROCEDURE — 84439 ASSAY OF FREE THYROXINE: CPT

## 2019-05-06 PROCEDURE — 76830 TRANSVAGINAL US NON-OB: CPT

## 2019-05-06 PROCEDURE — 2580000003 HC RX 258: Performed by: STUDENT IN AN ORGANIZED HEALTH CARE EDUCATION/TRAINING PROGRAM

## 2019-05-06 PROCEDURE — 99999 PR OFFICE/OUTPT VISIT,PROCEDURE ONLY: CPT | Performed by: INTERNAL MEDICINE

## 2019-05-06 PROCEDURE — 99254 IP/OBS CNSLTJ NEW/EST MOD 60: CPT | Performed by: INTERNAL MEDICINE

## 2019-05-06 PROCEDURE — 6360000002 HC RX W HCPCS: Performed by: STUDENT IN AN ORGANIZED HEALTH CARE EDUCATION/TRAINING PROGRAM

## 2019-05-06 PROCEDURE — 85245 CLOT FACTOR VIII VW RISTOCTN: CPT

## 2019-05-06 PROCEDURE — 85397 CLOTTING FUNCT ACTIVITY: CPT

## 2019-05-06 PROCEDURE — 2060000000 HC ICU INTERMEDIATE R&B

## 2019-05-06 PROCEDURE — 85025 COMPLETE CBC W/AUTO DIFF WBC: CPT

## 2019-05-06 PROCEDURE — 82671 ASSAY OF ESTROGENS: CPT

## 2019-05-06 PROCEDURE — 76856 US EXAM PELVIC COMPLETE: CPT

## 2019-05-06 PROCEDURE — 85014 HEMATOCRIT: CPT

## 2019-05-06 RX ORDER — MEDROXYPROGESTERONE ACETATE 2.5 MG/1
20 TABLET ORAL 2 TIMES DAILY
Status: DISCONTINUED | OUTPATIENT
Start: 2019-05-06 | End: 2019-05-06

## 2019-05-06 RX ORDER — MEDROXYPROGESTERONE ACETATE 2.5 MG/1
20 TABLET ORAL 3 TIMES DAILY
Status: DISCONTINUED | OUTPATIENT
Start: 2019-05-06 | End: 2019-05-08 | Stop reason: HOSPADM

## 2019-05-06 RX ORDER — 0.9 % SODIUM CHLORIDE 0.9 %
250 INTRAVENOUS SOLUTION INTRAVENOUS ONCE
Status: DISCONTINUED | OUTPATIENT
Start: 2019-05-06 | End: 2019-05-08 | Stop reason: HOSPADM

## 2019-05-06 RX ADMIN — SODIUM CHLORIDE: 9 INJECTION, SOLUTION INTRAVENOUS at 07:38

## 2019-05-06 RX ADMIN — MEDROXYPROGESTERONE ACETATE 20 MG: 2.5 TABLET ORAL at 23:09

## 2019-05-06 RX ADMIN — ACETAMINOPHEN 650 MG: 325 TABLET, FILM COATED ORAL at 07:38

## 2019-05-06 RX ADMIN — Medication 10 ML: at 23:10

## 2019-05-06 RX ADMIN — FAMOTIDINE 20 MG: 20 TABLET ORAL at 23:09

## 2019-05-06 RX ADMIN — SODIUM CHLORIDE: 9 INJECTION, SOLUTION INTRAVENOUS at 18:16

## 2019-05-06 RX ADMIN — IRON SUCROSE 200 MG: 20 INJECTION, SOLUTION INTRAVENOUS at 16:10

## 2019-05-06 RX ADMIN — FAMOTIDINE 20 MG: 20 TABLET ORAL at 07:38

## 2019-05-06 RX ADMIN — MEDROXYPROGESTERONE ACETATE 20 MG: 2.5 TABLET ORAL at 16:09

## 2019-05-06 ASSESSMENT — ENCOUNTER SYMPTOMS
DIARRHEA: 0
COUGH: 0
SORE THROAT: 0
NAUSEA: 0
SINUS PAIN: 0
SHORTNESS OF BREATH: 0
EYES NEGATIVE: 1
BACK PAIN: 0
SINUS PRESSURE: 0
WHEEZING: 0
GASTROINTESTINAL NEGATIVE: 1
VOMITING: 0
CONSTIPATION: 0
TROUBLE SWALLOWING: 0

## 2019-05-06 ASSESSMENT — PAIN SCALES - GENERAL
PAINLEVEL_OUTOF10: 2
PAINLEVEL_OUTOF10: 5

## 2019-05-06 NOTE — PROGRESS NOTES
RN called and spoke with ultrasound. RN inquired about time for patient's ultrasound. RN was informed patient would not be seen until at least 1:00 to 1:30.

## 2019-05-06 NOTE — DISCHARGE SUMMARY
[address] [facility]    Discharge Summary     Patient ID: Sarita Dumont  :  1980   MRN: [id number]     ACCOUNT:  [de-identified]   Patient's PCP: No primary care provider on file. Admit Date: 2019   Discharge Date: 2019   Length of Stay: 3  Code Status:  Full Code  Admitting Physician: Marco A Cloud MD  Discharge Physician: Diego Hanson MD     Active Discharge Diagnoses:       Primary Problem  [facility] Problems    Diagnosis Date Noted    History of endometrial biopsy 19 [Z92.89] 2019    Anemia [D64.9] 2019    Morbid obesity with BMI of 50.0-59.9, adult ([facility] [address] [E66.01, Z68.43] 2019    Heavy menses [N92.0] 2019       Admission Condition:  poor     Discharged Condition: stable    Hospital Stay:       Hospital Course:  Sarita Dumont is a 41 y.o. female who was admitted for the management of  Anemia, presented to ER with Vaginal Bleeding    Patient presented with weakness fatigue and shortness of breath approximately one day. She had a history of menorrhagia in the past since the onset of menses at the age of 13. Her last cycle was particularly heavy, he had soaked through approximately 8-10 pads on Thursday and also entire toilet bowl full of blood. On presentation to the ED patient had a hemoglobin of 4.7. ObGyn consult had been obtained in the ER and recommended to obtain TVUS. Following the TVUS results, OBGYN will determine whether patient will have medical versus surgical management. An endometrial biopsy was performed at the bedside, with pathology pending at this time. She had been provided with 6 units of packed red blood cells as well as IV Venofer. Hemo-onc was also consulted. Iron studies TIBC and other labs to determine the etiology were obtained, and the iron deficiency anemia is secondary to chronic blood losses of menorrhagia.   She had an elevated HbA1c of 7, thus diabetes education was also provided to the patient. Today on day of discharge pt feels better with no further complaints. Vitals and Labs are at pts baseline. All consultants involved during this admission are agreeable to d/c with OP follow up.     Significant therapeutic interventions: 6 Units pRBC's, IV Venofer, IV Fluids    Significant Diagnostic Studies:   Labs / Micro:  CBC:   Lab Results   Component Value Date    WBC 13.8 05/08/2019    RBC 3.47 05/08/2019    HGB 8.0 05/08/2019    HCT 25.5 05/08/2019    MCV 73.5 05/08/2019    MCH 23.2 05/08/2019    MCHC 31.6 05/08/2019    RDW 28.3 05/08/2019     05/08/2019     BMP:    Lab Results   Component Value Date    GLUCOSE 123 05/08/2019     05/08/2019    K 3.8 05/08/2019     05/08/2019    CO2 22 05/08/2019    ANIONGAP 11 05/08/2019    BUN 8 05/08/2019    CREATININE 0.67 05/08/2019    BUNCRER NOT REPORTED 05/08/2019    CALCIUM 7.8 05/08/2019    LABGLOM >60 05/08/2019    GFRAA >60 05/08/2019    GFR      05/08/2019    GFR NOT REPORTED 05/08/2019     HFP:    Lab Results   Component Value Date    PROT 6.7 05/05/2019     CMP:    Lab Results   Component Value Date    GLUCOSE 123 05/08/2019     05/08/2019    K 3.8 05/08/2019     05/08/2019    CO2 22 05/08/2019    BUN 8 05/08/2019    CREATININE 0.67 05/08/2019    ANIONGAP 11 05/08/2019    ALKPHOS 45 05/05/2019    ALT 8 05/05/2019    AST 13 05/05/2019    BILITOT 0.32 05/05/2019    LABALBU 3.9 05/05/2019    ALBUMIN NOT REPORTED 05/05/2019    LABGLOM >60 05/08/2019    GFRAA >60 05/08/2019    GFR      05/08/2019    GFR NOT REPORTED 05/08/2019    PROT 6.7 05/05/2019    CALCIUM 7.8 05/08/2019     PT/INR:  No results found for: PTINR  PTT: No results found for: APTT  FLP:  No results found for: CHOL, TRIG, HDL  U/A:    Lab Results   Component Value Date    COLORU RED 05/05/2019    TURBIDITY CLOUDY 05/05/2019    SPECGRAV 1.019 05/05/2019    HGBUR LARGE 05/05/2019    PHUR 7.5 same as other medications prescribed for you. Read the directions carefully, and ask your doctor or other care provider to review them with you. STOP taking these medications    cephALEXin 500 MG capsule  Commonly known as:  Georgiabal Valadez           Where to Get Your Medications      You can get these medications from any pharmacy    Bring a paper prescription for each of these medications  · ferrous sulfate 325 (65 Fe) MG EC tablet  · medroxyPROGESTERone 10 MG tablet  · medroxyPROGESTERone 10 MG tablet       Time Spent on discharge is  33 mins in patient examination, evaluation, counseling as well as medication reconciliation, prescriptions for required medications, discharge plan and follow up. Electronically signed by   Shaneka Cline MD  5/8/2019  1:50 PM      IM Attending     Pt seen and examined before discharge   Discharge plan and medications were reviewed and agreed as documented by resident.   Time spent for discharge planning more than 30 minutes     Electronically signed by Luis Pisano MD on 5/12/2019 at 11:21 PM

## 2019-05-06 NOTE — PROGRESS NOTES
OB resident to floor to see patient. Updated MD on patient's current bleeding.  Orders received for a stat H/H.

## 2019-05-06 NOTE — PROGRESS NOTES
Obstetric/Gynecology Resident Interval Note    Patient seen and examined. She is sitting on toilet attempting to have a a bowel movement, but she noted she was actively bleeding, appears diaphoretic and very pale. Patient sat on toilet until felt a little better, then I assisted patient up so she could wipe. She had to do stand and then sit twice to wipe front and then back because she was unable to tolerate standing for too long. Ordered Stat H&H at this time as well as a set of vitals. Patient not tachycardic with pulse 87. Patient sat on the toilet a little longer then felt well enough to take a few steps to the bedside, where she was able to sit on the bed. Patient repositioned in bed, a new chux was placed to monitor her bleeding and patient was cleaned up. Blood in toilet estimated to be about 200mL.     Vitals:    05/06/19 0345 05/06/19 0839 05/06/19 1240 05/06/19 1611   BP: 128/75 121/75 132/71 101/65   Pulse: 85 86 81 87   Resp: 18 16 16 16   Temp: 98.2 °F (36.8 °C) 98 °F (36.7 °C) 98 °F (36.7 °C) 97.7 °F (36.5 °C)   TempSrc: Oral Oral Oral Oral   SpO2: 98% 100% 100% 100%   Weight:       Height:         Narrative   EXAMINATION:   PELVIC ULTRASOUND       5/6/2019       TECHNIQUE:   Transvaginal pelvic ultrasound was performed.  Color Doppler evaluation was   performed.       COMPARISON:   None       HISTORY:   ORDERING SYSTEM PROVIDED HISTORY: PAIN, PELVIS   TECHNOLOGIST PROVIDED HISTORY:   Ordering Physician Provided Reason for Exam: AUB   Acuity: Acute   Type of Exam: Initial       FINDINGS:       Measurements:       Uterus:  16.6 x 8.7 x 9.5 cm.       Endometrial stripe:  34.5 mm.       Right Ovary:  Not visualized.       Left Ovary:  Not visualized.           Ultrasound Findings:       Uterus: Uterus is enlarged and demonstrates normal myometrial echotexture   without discrete fibroids.       Endometrial stripe: Endometrial stripe is abnormally thickened.  Slight   undulation at the myometrial endometrial junction is noted.       Right Ovary: Right ovary is not visualized.       Left Ovary:  Left ovary is not visualized.       Free Fluid: No evidence of free fluid.           Impression   Enlarged uterus with abnormally thickened endometrium suggestive of   endometrial pathology.  Neither ovary is visualized.                 Discussed TVUS results, TSH and HgbA1C results with patient at this time. Progesterone 20mg TID started today per GYN provider, awaiting dose from pharmacy at this time. Will obtain endometrial biopsy at this time per GYN attending. Patient reports she does not have insurance right now but understands that she will need very close follow up for continued medical management versus likely surgical management. Explained above to family at bedside as well (patient's grandmother and her son). Patient verbalized understanding. All questions and concerns answered.     Dr. Kailyn Garcia in agreement with plan    Sawyer Zarco DO  OB/GYN Resident, PGY3  [facility]  5/6/2019, 4:08 PM

## 2019-05-06 NOTE — FLOWSHEET NOTE
provided listening presence, words of comfort and prayer. pt was smiling and in good spirits says she is getting good care. Chaplains remain available for spiritual or emotional support as needed.       05/06/19 1308   Encounter Summary   Services provided to: Patient   Referral/Consult From: [facility] Family members   Continue Visiting   (5/6/2019)   Complexity of Encounter Moderate   Length of Encounter 15 minutes   Routine   Type Initial   Assessment Hopeful   Intervention Active listening;Nurtured hope;[place]   Outcome Expressed gratitude

## 2019-05-06 NOTE — CONSULTS
Today's Date: 5/6/2019  Patient Name: Ahsan Whitt  Date of admission: 5/5/2019 11:11 AM  Patient's age: 41 y. o., 1980  Admission Dx: Anemia [D64.9]    Reason for Consult: john, Hgb 4.7  Requesting Physician: Felicia Keen MD    CHIEF COMPLAINT:    Chief Complaint   Patient presents with    Vaginal Bleeding     History Obtained From:  Pt and chart    HISTORY OF PRESENT ILLNESS:      This is a 40 YO female with history heavy periods/menorrhagia was a admitted with fatigue, SOB, tiredness. ON admission Hb noted 4.7 with very low iron levels. Received 4 units PRBc and now hb 7.6. She is seen by Gyn and Rg Diggs was attempted. Started on provera. She is also receiving IV venofer. NO family history of bleeding disorder, no night sweats wt loss fever chills. No blood in stool. Past Medical History:   has no past medical history on file. Past Surgical History:   has a past surgical history that includes Dilation and curettage of uterus. Medications:    Prior to Admission medications    Medication Sig Start Date End Date Taking? Authorizing Provider   cephALEXin (KEFLEX) 500 MG capsule Take 1 capsule by mouth 3 times daily.  12/2/14   Linda Polanco MD     Current Facility-Administered Medications   Medication Dose Route Frequency Provider Last Rate Last Dose    0.9 % sodium chloride bolus  250 mL Intravenous Once Samir Chawla MD        medroxyPROGESTERone (PROVERA) tablet 20 mg  20 mg Oral TID Denae Walsh,         0.9 % sodium chloride bolus  250 mL Intravenous Once Rea Peoples MD        sodium chloride flush 0.9 % injection 10 mL  10 mL Intravenous 2 times per day Esther Cruz MD        sodium chloride flush 0.9 % injection 10 mL  10 mL Intravenous PRN Esther Cruz MD        ondansetron [facility]) injection 4 mg  4 mg Intravenous Q6H PRN Esther Cruz MD        0.9 % sodium chloride infusion   Intravenous Continuous Esther Cruz  mL/hr at 05/06/19 0290      potassium chloride (KLOR-CON M) extended release tablet 40 mEq  40 mEq Oral PRN Bruna Esquievl MD        Or    potassium bicarb-citric acid (EFFER-K) effervescent tablet 40 mEq  40 mEq Oral PRN Bruna Esquivel MD        Or    potassium chloride 10 mEq/100 mL IVPB (Peripheral Line)  10 mEq Intravenous PRN Bruna Esquivel MD        magnesium sulfate 1 g in dextrose 5% 100 mL IVPB  1 g Intravenous PRN Bruna Esquivel MD        polyethylene glycol (GLYCOLAX) packet 17 g  17 g Oral Daily PRN Bruna Esquivel MD        famotidine (PEPCID) tablet 20 mg  20 mg Oral BID Bruna Esquivel MD   20 mg at 05/06/19 0738    acetaminophen (TYLENOL) tablet 650 mg  650 mg Oral Q4H PRN Bruna Esquivel MD   650 mg at 05/06/19 0738    iron sucrose (VENOFER) 200 mg in sodium chloride 0.9 % 100 mL IVPB  200 mg Intravenous Q24H Bruna Esquivel MD   Stopped at 05/06/19 1715    0.9 % sodium chloride bolus  250 mL Intravenous Once Connie Graham MD           Allergies:  Patient has no known allergies. Social History:   reports that she has never smoked. She has never used smokeless tobacco. She reports that she drinks alcohol. She reports that she does not use drugs. Family History: family history is not on file. Family history was reviewed and no pertinent family history noted. REVIEW OF SYSTEMS:    Constitutional: ++fatigue. No fever or chills. No night sweats, no weight loss   Eyes: No eye discharge, double vision, or eye pain   HEENT: negative for sore mouth, sore throat, hoarseness and voice change   Respiratory: negative for cough , sputum, dyspnea, wheezing, hemoptysis, chest pain   Cardiovascular: negative for chest pain, dyspnea, palpitations, orthopnea, PND   Gastrointestinal: negative for nausea, vomiting, diarrhea, constipation, abdominal pain, Dysphagia, hematemesis and hematochezia   Genitourinary: negative for frequency, dysuria, nocturia, urinary incontinence, and hematuria   Integument: negative for rash, skin lesions, bruises. Hematologic/Lymphatic: negative for easy bruising, bleeding, lymphadenopathy, or petechiae   Endocrine: negative for heat or cold intolerance,weight changes, change in bowel habits and hair loss   Musculoskeletal: negative for myalgias, arthralgias, pain, joint swelling,and bone pain   Neurological: negative for headaches, dizziness, seizures, weakness, numbness    PHYSICAL EXAM:      /65   Pulse 87   Temp 97.7 °F (36.5 °C) (Oral)   Resp 16   Ht 5' 2\" (1.575 m)   Wt 283 lb (128.4 kg)   LMP 2019   SpO2 100%   BMI 51.76 kg/m²    Temp (24hrs), Av.4 °F (36.9 °C), Min:97.7 °F (36.5 °C), Max:98.7 °F (37.1 °C)    General appearance - well appearing, no in pain or distress   Mental status - alert and cooperative   Eyes - pupils equal and reactive, extraocular eye movements intact   Ears - bilateral TM's and external ear canals normal   Mouth - mucous membranes moist, pharynx normal without lesions   Neck - supple, no significant adenopathy   Lymphatics - no palpable lymphadenopathy, no hepatosplenomegaly   Chest - clear to auscultation, no wheezes, rales or rhonchi, symmetric air entry   Heart - normal rate, regular rhythm, normal S1, S2, no murmurs  Abdomen - soft, nontender, nondistended, no masses or organomegaly   Neurological - alert, oriented, normal speech, no focal findings or movement disorder noted   Musculoskeletal - no joint tenderness, deformity or swelling   Extremities - peripheral pulses normal, no pedal edema, no clubbing or cyanosis   Skin - normal coloration and turgor, no rashes, no suspicious skin lesions noted ,    DATA:    Labs:   CBC:   Recent Labs     19  1139  19  0545 19  1630   WBC 9.3  --  9.8  --    HGB 4.7*   < > 8.1* 7.6*   HCT 15.5*   < > 25.5* 25.0*     --  194  --     < > = values in this interval not displayed.      BMP:   Recent Labs     19  1139 19  0545    139   K 4.2 4.5   CO2 24 24   BUN 13 9   CREATININE 0.76 0.81

## 2019-05-06 NOTE — PLAN OF CARE
Problem: Bleeding:  Goal: Will show no signs and symptoms of excessive bleeding  Description  Will show no signs and symptoms of excessive bleeding  Outcome: Ongoing   Pt received 2 units of blood throughout this shift. Current hgb is 8.1.  OB resident updated

## 2019-05-06 NOTE — PROGRESS NOTES
RN paged OB resident and updated on patient's soaked 2 lyndon pads and 2 towels with vaginal bleeding. Updated that patient is still sitting on the toilet with actively bleeding and is now feeling clots pass. MD states she will look into orders.

## 2019-05-06 NOTE — PROGRESS NOTES
[address] [facility].    PROGRESS NOTE             5/6/2019    8:11 AM    Name:   Pao Thibodeaux  MRN:     [id number]     Acct:      [de-identified]   Room:   [facility] [place] [address] Day:  1  Admit Date:  5/5/2019 11:11 AM    PCP:  No primary care provider on file. Code Status:  Full Code    Subjective:     C/C:   Chief Complaint   Patient presents with    Vaginal Bleeding     Interval History Status: improved. Patient was seen and examined at the bedside this morning. No acute overnight events noted. Patient notes she feels much better, she feels a lot less fatigued. Denies CP, SOB. Denies any vaginal bleeding at this time. Patient c/o headache, bilateral, not the worse headache of her life. Hx of prior headaches in the past, treated with OTC tylenol/motrin. Patient was transfused 4 Units of pRBC's thus far. IV Venofer is being provided to the patient as well. Brief History:     Per Epic EMR H&P:    \"The patient is a 41 y.o. Non-/non  female who presents withVaginal Bleeding   and she is admitted to the hospital for further evaluation and symptomatic management.     Patient presents with a history of weakness, fatigue and shortness of breath that has occurred for approximately 1 day. She has a history of menorrhagia in the past.     Her current menstrual period started on 5/1/2019. Her prior LMP was at the start of last month. Her menses occur at approximately 30 day intervals. Her first day is heavy, with approximately 8-10 pads soaked through followed by a lighter 1-2 pads/day for 2-3 days after. She had been on Depo for a few years where she did not have any periods. This period was different as she had filled an entire toilet bowl full of blood and she also had clots present. Menarche is at the age of 13.      She denies any urinary symptoms, including dysuria, frequency, flank pain, suprapubic tenderness.  Denies voiding any blood. No use of alcohol or regular NSAID usage. 1+ month ago she had a cough with fever that has not completely resolved.     Patient notes that her grandmother had a history of a bleeding disorder, of which she had been tested in childhood and was found to be negative. Paternal history is positive for Skin Ca. Patient denies any history of smoking/exposure, last alcoholic drink was 1+ years ago and denies use of any drugs. Patient denies any history of STI's. \"    Review of Systems:     Review of Systems   Constitutional: Negative for chills, fatigue and fever. HENT: Negative. Negative for sinus pressure, sinus pain, sore throat and trouble swallowing. Eyes: Negative. Respiratory: Negative for cough, shortness of breath and wheezing. Cardiovascular: Negative for chest pain, palpitations and leg swelling. Gastrointestinal: Negative. Negative for constipation, diarrhea, nausea and vomiting. Genitourinary: Negative. Negative for difficulty urinating, dysuria, flank pain, hematuria and urgency. Musculoskeletal: Negative. Negative for back pain, gait problem and neck pain. Skin: Negative. Negative for rash. Neurological: Negative for syncope, weakness, light-headedness and headaches. Psychiatric/Behavioral: Negative. Negative for confusion and self-injury. The patient is not nervous/anxious. Medications:      Allergies:  No Known Allergies    Current Meds:   Scheduled Meds:    sodium chloride  250 mL Intravenous Once    sodium chloride  250 mL Intravenous Once    sodium chloride flush  10 mL Intravenous 2 times per day    famotidine  20 mg Oral BID    iron sucrose  200 mg Intravenous Q24H    sodium chloride  250 mL Intravenous Once     Continuous Infusions:    sodium chloride 100 mL/hr at 05/06/19 0738     PRN Meds: sodium chloride flush, ondansetron, potassium chloride **OR** potassium alternative oral replacement **OR** potassium chloride, magnesium sulfate, polyethylene glycol, acetaminophen    Data:     Past Medical History:   has no past medical history on file. Social History:   reports that she has never smoked. She has never used smokeless tobacco. She reports that she drinks alcohol. She reports that she does not use drugs. Family History: History reviewed. No pertinent family history. Vitals:  /75   Pulse 85   Temp 98.2 °F (36.8 °C) (Oral)   Resp 18   Ht 5' 2\" (1.575 m)   Wt 283 lb (128.4 kg)   LMP 2019   SpO2 98%   BMI 51.76 kg/m²   Temp (24hrs), Av.3 °F (36.8 °C), Min:97.6 °F (36.4 °C), Max:98.7 °F (37.1 °C)    No results for input(s): POCGLU in the last 72 hours. I/O(24Hr): Intake/Output Summary (Last 24 hours) at 2019 0811  Last data filed at 2019 0345  Gross per 24 hour   Intake 1148.75 ml   Output --   Net 1148.75 ml       Labs:    CBC with Differential:    Lab Results   Component Value Date    WBC 9.8 2019    RBC 3.67 2019    HGB 8.1 2019    HCT 25.5 2019     2019    MCV 69.4 2019    MCH 22.0 2019    MCHC 31.6 2019    RDW 28.0 2019    LYMPHOPCT 23 2019    MONOPCT 6 2019    BASOPCT 1 2019    MONOSABS 0.59 2019    LYMPHSABS 2.25 2019    EOSABS 0.29 2019    BASOSABS 0.10 2019    DIFFTYPE NOT REPORTED 2019     Hemoglobin/Hematocrit:    Lab Results   Component Value Date    HGB 8.1 2019    HCT 25.5 2019     BMP:    Lab Results   Component Value Date     2019    K 4.5 2019     2019    CO2 24 2019    BUN 9 2019    LABALBU 3.9 2019    CREATININE 0.81 2019    CALCIUM 8.4 2019    GFRAA >60 2019    LABGLOM >60 2019    GLUCOSE 135 2019         No results found for: SPECIAL  No results found for: CULTURE    Radiology:    No results found.       Physical Examination:        Physical Exam   Constitutional: She is oriented to person, place, and time. She appears well-developed and well-nourished. No distress. HENT:   Head: Normocephalic and atraumatic. Eyes: Pupils are equal, round, and reactive to light. Conjunctivae are normal. Right eye exhibits no discharge. Left eye exhibits no discharge. Neck: Normal range of motion. Neck supple. No tracheal deviation present. No thyromegaly present. Cardiovascular: Normal rate, regular rhythm and normal heart sounds. No murmur heard. Pulmonary/Chest: Effort normal and breath sounds normal. No respiratory distress. She has no wheezes. Abdominal: Soft. Bowel sounds are normal. She exhibits no distension. There is no tenderness. Musculoskeletal: Normal range of motion. Neurological: She is alert and oriented to person, place, and time. Skin: Skin is warm and dry. Capillary refill takes less than 2 seconds. She is not diaphoretic. There is pallor. Psychiatric: She has a normal mood and affect. Vitals reviewed.         Assessment:        Primary Problem  Anemia    Active Hospital Problems    Diagnosis Date Noted    Anemia [D64.9] 05/05/2019    Morbid obesity with BMI of 50.0-59.9[facility] [E66.01, Z68.43] 05/05/2019    Abnormal uterine bleeding (AUB) [N93.9] 05/05/2019    Heavy menses [N92.0] 05/05/2019    Hyperglycemia [R73.9] 05/05/2019       Plan:        Anemia, likely iron deficiency anemia, chronic and due to Menorrhagia, though other etiologies are being considered  -ObGyn following              -medical vs surgical management   -f/u TVUS  -HemeOnc Consult  -f/u daily CBC, BMP, H&H q8h for now, transfuse pRBC's if Hgb <7.  -Peripheral smear Path review  -f/u celiac panel, haptoglobin, [id number], antithrombin 3, factor 8, protein c+s, ADRIANNA  -f/u fecal occult blood screen  -IV Venofer 200mg X5 doses, will continue with PO iron supplements post discharge     Elevated Random Blood Glucose  -f/u HbA1c    Castro Gonzalez MD  5/6/2019  8:11 AM         IM Attending    Pt seen and examined today   I have discussed the care of pt , including pertinent history and exam findings,  with the resident. I have reviewed the key elements of all parts of the encounter with the resident. I agree with the assessment, plan and orders as documented by the resident.     Electronically signed by Leonardo Bone MD on 5/6/2019 at 10:41 AM

## 2019-05-06 NOTE — FLOWSHEET NOTE
05/06/19 0024   Provider Notification   Reason for Communication Review case;New orders   Provider Name Dr. Avani Bridges   Provider Notification Physician   Method of Communication Call   Response See orders   Notification Time 0022   RN spoke with Dr. Avani Bridges regarding the pts Hgb 6.9. Physician stated to transfuse 1 more unit of blood and to recheck hgb after.

## 2019-05-06 NOTE — PROGRESS NOTES
vitals stable   - IM primary. GYN consulted    - Hem/onc consulted     - Hgb improvin.7>6.2>6.9>8.1 this morning   - S/p 4U pRBCs   - IV Venofer x4 doses ordered   - IVF, pepcid   - TVUS ordered   - Lab work ordered per primary. GC/C and UA added on.    - Continue care, please page with any questions. Will follow along.  Patient to have close follow up with GYN as outpatient for further management     BMI 51.9    Enlarged Uterus on Bimanual    - TVUS ordered    Hyperglycemia   - Glucose 235 on admission   - Hgb A1C ordered       Patient Active Problem List    Diagnosis Date Noted    Anemia 2019    Morbid obesity with BMI of 50.0-59.9, adult ([facility] 2019    Abnormal uterine bleeding (AUB) 2019    Heavy menses 2019    Hyperglycemia 2019         Reid Reason, DO  Ob/Gyn Resident  Pager: [phone]  2019, 6:30 AM

## 2019-05-06 NOTE — PROGRESS NOTES
RN called and updated both medicine residents and OB residents to update that patient was unable to complete transvaginal US due to significant bleeding. No other orders received that this time.

## 2019-05-07 PROBLEM — Z92.89 HISTORY OF ENDOMETRIAL BIOPSY: Status: ACTIVE | Noted: 2019-05-07

## 2019-05-07 LAB
ABSOLUTE EOS #: 0.26 K/UL (ref 0–0.4)
ABSOLUTE IMMATURE GRANULOCYTE: ABNORMAL K/UL (ref 0–0.3)
ABSOLUTE LYMPH #: 2.06 K/UL (ref 1–4.8)
ABSOLUTE MONO #: 0.65 K/UL (ref 0.1–1.3)
ANION GAP SERPL CALCULATED.3IONS-SCNC: 8 MMOL/L (ref 9–17)
ANTI-NUCLEAR ANTIBODY (ANA): NEGATIVE
BASOPHILS # BLD: 1 % (ref 0–2)
BASOPHILS ABSOLUTE: 0.13 K/UL (ref 0–0.2)
BUN BLDV-MCNC: 9 MG/DL (ref 6–20)
BUN/CREAT BLD: ABNORMAL (ref 9–20)
CALCIUM SERPL-MCNC: 7.8 MG/DL (ref 8.6–10.4)
CHLORIDE BLD-SCNC: 105 MMOL/L (ref 98–107)
CO2: 25 MMOL/L (ref 20–31)
CREAT SERPL-MCNC: 0.68 MG/DL (ref 0.5–0.9)
DIFFERENTIAL TYPE: ABNORMAL
EOSINOPHILS RELATIVE PERCENT: 2 % (ref 0–4)
GFR AFRICAN AMERICAN: >60 ML/MIN
GFR NON-AFRICAN AMERICAN: >60 ML/MIN
GFR SERPL CREATININE-BSD FRML MDRD: ABNORMAL ML/MIN/{1.73_M2}
GFR SERPL CREATININE-BSD FRML MDRD: ABNORMAL ML/MIN/{1.73_M2}
GLUCOSE BLD-MCNC: 112 MG/DL (ref 65–105)
GLUCOSE BLD-MCNC: 135 MG/DL (ref 70–99)
HCT VFR BLD CALC: 22.7 % (ref 36–46)
HCT VFR BLD CALC: 23.8 % (ref 36–46)
HEMOGLOBIN: 7 G/DL (ref 12–16)
HEMOGLOBIN: 7.4 G/DL (ref 12–16)
IMMATURE GRANULOCYTES: ABNORMAL %
LYMPHOCYTES # BLD: 16 % (ref 24–44)
MCH RBC QN AUTO: 22.4 PG (ref 26–34)
MCHC RBC AUTO-ENTMCNC: 31.3 G/DL (ref 31–37)
MCV RBC AUTO: 71.7 FL (ref 80–100)
MONOCYTES # BLD: 5 % (ref 1–7)
MORPHOLOGY: ABNORMAL
NRBC AUTOMATED: ABNORMAL PER 100 WBC
PDW BLD-RTO: 28.9 % (ref 11.5–14.9)
PLATELET # BLD: 174 K/UL (ref 150–450)
PLATELET ESTIMATE: ABNORMAL
PMV BLD AUTO: 9.2 FL (ref 6–12)
POTASSIUM SERPL-SCNC: 3.6 MMOL/L (ref 3.7–5.3)
RBC # BLD: 3.31 M/UL (ref 4–5.2)
RBC # BLD: ABNORMAL 10*6/UL
SEG NEUTROPHILS: 76 % (ref 36–66)
SEGMENTED NEUTROPHILS ABSOLUTE COUNT: 9.8 K/UL (ref 1.3–9.1)
SODIUM BLD-SCNC: 138 MMOL/L (ref 135–144)
WBC # BLD: 12.9 K/UL (ref 3.5–11)
WBC # BLD: ABNORMAL 10*3/UL

## 2019-05-07 PROCEDURE — 86900 BLOOD TYPING SEROLOGIC ABO: CPT

## 2019-05-07 PROCEDURE — 6370000000 HC RX 637 (ALT 250 FOR IP): Performed by: STUDENT IN AN ORGANIZED HEALTH CARE EDUCATION/TRAINING PROGRAM

## 2019-05-07 PROCEDURE — 99999 PR OFFICE/OUTPT VISIT,PROCEDURE ONLY: CPT | Performed by: INTERNAL MEDICINE

## 2019-05-07 PROCEDURE — 85014 HEMATOCRIT: CPT

## 2019-05-07 PROCEDURE — 85025 COMPLETE CBC W/AUTO DIFF WBC: CPT

## 2019-05-07 PROCEDURE — 2580000003 HC RX 258: Performed by: STUDENT IN AN ORGANIZED HEALTH CARE EDUCATION/TRAINING PROGRAM

## 2019-05-07 PROCEDURE — 88305 TISSUE EXAM BY PATHOLOGIST: CPT

## 2019-05-07 PROCEDURE — P9016 RBC LEUKOCYTES REDUCED: HCPCS

## 2019-05-07 PROCEDURE — 2580000003 HC RX 258: Performed by: NURSE PRACTITIONER

## 2019-05-07 PROCEDURE — 99232 SBSQ HOSP IP/OBS MODERATE 35: CPT | Performed by: INTERNAL MEDICINE

## 2019-05-07 PROCEDURE — 88360 TUMOR IMMUNOHISTOCHEM/MANUAL: CPT

## 2019-05-07 PROCEDURE — 2060000000 HC ICU INTERMEDIATE R&B

## 2019-05-07 PROCEDURE — 0UDB7ZX EXTRACTION OF ENDOMETRIUM, VIA NATURAL OR ARTIFICIAL OPENING, DIAGNOSTIC: ICD-10-PCS | Performed by: STUDENT IN AN ORGANIZED HEALTH CARE EDUCATION/TRAINING PROGRAM

## 2019-05-07 PROCEDURE — 36415 COLL VENOUS BLD VENIPUNCTURE: CPT

## 2019-05-07 PROCEDURE — 6360000002 HC RX W HCPCS: Performed by: STUDENT IN AN ORGANIZED HEALTH CARE EDUCATION/TRAINING PROGRAM

## 2019-05-07 PROCEDURE — 1200000000 HC SEMI PRIVATE

## 2019-05-07 PROCEDURE — 82947 ASSAY GLUCOSE BLOOD QUANT: CPT

## 2019-05-07 PROCEDURE — 85018 HEMOGLOBIN: CPT

## 2019-05-07 PROCEDURE — 80048 BASIC METABOLIC PNL TOTAL CA: CPT

## 2019-05-07 RX ORDER — 0.9 % SODIUM CHLORIDE 0.9 %
250 INTRAVENOUS SOLUTION INTRAVENOUS ONCE
Status: DISCONTINUED | OUTPATIENT
Start: 2019-05-07 | End: 2019-05-08 | Stop reason: HOSPADM

## 2019-05-07 RX ORDER — 0.9 % SODIUM CHLORIDE 0.9 %
250 INTRAVENOUS SOLUTION INTRAVENOUS ONCE
Status: COMPLETED | OUTPATIENT
Start: 2019-05-07 | End: 2019-05-07

## 2019-05-07 RX ADMIN — MEDROXYPROGESTERONE ACETATE 20 MG: 2.5 TABLET ORAL at 21:36

## 2019-05-07 RX ADMIN — MEDROXYPROGESTERONE ACETATE 20 MG: 2.5 TABLET ORAL at 10:05

## 2019-05-07 RX ADMIN — SODIUM CHLORIDE: 9 INJECTION, SOLUTION INTRAVENOUS at 14:48

## 2019-05-07 RX ADMIN — IRON SUCROSE 200 MG: 20 INJECTION, SOLUTION INTRAVENOUS at 15:37

## 2019-05-07 RX ADMIN — FAMOTIDINE 20 MG: 20 TABLET ORAL at 08:07

## 2019-05-07 RX ADMIN — SODIUM CHLORIDE 250 ML: 9 INJECTION, SOLUTION INTRAVENOUS at 05:00

## 2019-05-07 RX ADMIN — FAMOTIDINE 20 MG: 20 TABLET ORAL at 21:36

## 2019-05-07 RX ADMIN — MEDROXYPROGESTERONE ACETATE 20 MG: 2.5 TABLET ORAL at 14:50

## 2019-05-07 ASSESSMENT — ENCOUNTER SYMPTOMS
BACK PAIN: 0
NAUSEA: 0
TROUBLE SWALLOWING: 0
SORE THROAT: 0
SINUS PRESSURE: 0
SHORTNESS OF BREATH: 0
DIARRHEA: 0
EYES NEGATIVE: 1
GASTROINTESTINAL NEGATIVE: 1
VOMITING: 0
SINUS PAIN: 0
CONSTIPATION: 0
COUGH: 0
WHEEZING: 0

## 2019-05-07 ASSESSMENT — PAIN SCALES - GENERAL: PAINLEVEL_OUTOF10: 0

## 2019-05-07 NOTE — CARE COORDINATION
CASE MANAGEMENT NOTE:    Admission Date:  5/5/2019 Ness Harrison is a 41 y.o.  female    Admitted for : Anemia [D64.9]    Met with:  Patient    PCP:  Does not have one - Will obtain one once she obtains insurance                                Insurance:  Self Pay - Does not qualify for Medicaid at this time      Current Residence/ Living Arrangements:  independently at home             Current Services PTA:  No    Is patient agreeable to VNS: No    Freedom of choice provided: Yes    List of [address] provided: Yes    VNS chosen:  No    DME:  none    Home Oxygen: No    Nebulizer: No    CPAP/BIPAP: No    Supplier: N/A    Potential Assistance Needed: Yes - Follow for med assist    SNF needed: No    Pharmacy:  [address]       Is the Patient an [employer] with Readmission Risk Score greater than 14%? No  If yes, pt needs a follow up appointment made within 7 days. Does Patient want to use MEDS to BEDS? No    Family Members/Caregivers that pt would like involved in their care:    Yes    If yes, list name here: Mother Neda Davenport    Transportation Provider:  Patient             Is patient in Isolation/One on One/Altered Mental Status? No  If yes, skip next question. If no, would they like an I-Pad to  use? No  If yes, call [phone]. Discharge Plan:  5/7: SELF PAY - Patient is from home with mother and son. She is independent and drives. Works at [employer] 40 hours/week and does not qualify for [facility] but cannot afford the insurance offered. Declines DME. Follow for med assist. Has received 5 units of blood total and will need total hysterectomy soon. Will follow.  //TIM                 Electronically signed by: Patricia Merlos RN on 5/7/2019 at 2:28 PM

## 2019-05-07 NOTE — PROGRESS NOTES
[address]    PROGRESS NOTE             5/7/2019    8:09 AM    Name:   Aravind Nation  MRN:     [id number]     Acct:      [de-identified]   Room:   2088/2088-01   Day:  2  Admit Date:  5/5/2019 11:11 AM    PCP:  No primary care provider on file. Code Status:  Full Code    Subjective:     C/C:   Chief Complaint   Patient presents with    Vaginal Bleeding     Interval History Status: Stable    Patient was seen and examined at the bedside this morning. VSS, afebrile, on room air  Overnight the patient had a hemoglobin of 6.3  She was transfused one unit of packed red blood cells  Patient feels stable, unchanged since yesterday  She denies having any vaginal bleeding this morning  She continue continues to feel fatigued. She denies any chest pain, shortness of breath, cough, abdominal pain. No urinary symptoms. Brief History:     Per Epic EMR H&P:    \"The patient is a 41 y.o. Non-/non  female who presents withVaginal Bleeding   and she is admitted to the hospital for further evaluation and symptomatic management.     Patient presents with a history of weakness, fatigue and shortness of breath that has occurred for approximately 1 day. She has a history of menorrhagia in the past.     Her current menstrual period started on 5/1/2019. Her prior LMP was at the start of last month. Her menses occur at approximately 30 day intervals. Her first day is heavy, with approximately 8-10 pads soaked through followed by a lighter 1-2 pads/day for 2-3 days after. She had been on Depo for a few years where she did not have any periods. This period was different as she had filled an entire toilet bowl full of blood and she also had clots present. Menarche is at the age of 13.      She denies any urinary symptoms, including dysuria, frequency, flank pain, suprapubic tenderness. Denies voiding any blood.  No use of alcohol or regular NSAID usage. 1+ month ago she had a cough with fever that has not completely resolved.     Patient notes that her grandmother had a history of a bleeding disorder, of which she had been tested in childhood and was found to be negative. Paternal history is positive for Skin Ca. Patient denies any history of smoking/exposure, last alcoholic drink was 1+ years ago and denies use of any drugs. Patient denies any history of STI's. \"    Review of Systems:     Review of Systems   Constitutional: Negative for chills, fatigue and fever. HENT: Negative. Negative for sinus pressure, sinus pain, sore throat and trouble swallowing. Eyes: Negative. Respiratory: Negative for cough, shortness of breath and wheezing. Cardiovascular: Negative for chest pain, palpitations and leg swelling. Gastrointestinal: Negative. Negative for constipation, diarrhea, nausea and vomiting. Endocrine: Negative for polydipsia, polyphagia and polyuria. Genitourinary: Positive for vaginal bleeding (Occured yesterday post TVUS, vaginal bleeding currently stopped). Negative for difficulty urinating, dysuria, flank pain, hematuria, urgency, vaginal discharge and vaginal pain. Musculoskeletal: Negative. Negative for back pain, gait problem and neck pain. Skin: Negative. Negative for rash and wound. Neurological: Negative for syncope, weakness, light-headedness and headaches. Psychiatric/Behavioral: Negative. Negative for confusion and self-injury. The patient is not nervous/anxious. Medications:      Allergies:  No Known Allergies    Current Meds:   Scheduled Meds:    sodium chloride  250 mL Intravenous Once    sodium chloride  250 mL Intravenous Once    medroxyPROGESTERone  20 mg Oral TID    sodium chloride  250 mL Intravenous Once    sodium chloride flush  10 mL Intravenous 2 times per day    famotidine  20 mg Oral BID    iron sucrose  200 mg Intravenous Q24H    sodium chloride  250 mL Intravenous Once 05/07/2019    GLUCOSE 135 05/07/2019         Lab Results   Component Value Date/Time    SPECIAL NOT REPORTED 05/05/2019 07:10 PM     Lab Results   Component Value Date/Time    CULTURE NO SIGNIFICANT GROWTH 05/05/2019 07:10 PM       Radiology:    No results found. Physical Examination:        Physical Exam   Constitutional: She is oriented to person, place, and time. She appears well-developed and well-nourished. No distress. HENT:   Head: Normocephalic and atraumatic. Eyes: Pupils are equal, round, and reactive to light. Conjunctivae are normal. Right eye exhibits no discharge. Left eye exhibits no discharge. Neck: Normal range of motion. Neck supple. No tracheal deviation present. No thyromegaly present. Cardiovascular: Normal rate, regular rhythm and normal heart sounds. No murmur heard. Pulmonary/Chest: Effort normal and breath sounds normal. No respiratory distress. She has no wheezes. Abdominal: Soft. Bowel sounds are normal. She exhibits no distension. There is no tenderness. Musculoskeletal: Normal range of motion. She exhibits no edema or tenderness. Neurological: She is alert and oriented to person, place, and time. Skin: Skin is warm and dry. Capillary refill takes less than 2 seconds. She is not diaphoretic. There is pallor. Psychiatric: She has a normal mood and affect. Vitals reviewed.     Assessment:        Primary Problem  Anemia    Active Hospital Problems    Diagnosis Date Noted    Anemia [D64.9] 05/05/2019    Morbid obesity with BMI of 50.0-59.9, adult ([facility] [E66.01, Z68.43] 05/05/2019    Abnormal uterine bleeding (AUB) [N93.9] 05/05/2019    Heavy menses [N92.0] 05/05/2019    Hyperglycemia [R73.9] 05/05/2019       Plan:        Microcytic hypochromic Iron deficiency anemia, chronic and likely due to Menorrhagia  -ObGyn following   -Provera 20mg TID   -Endometrial Biopsy today  -Monitor H/H q8h for now   -Transfuse 1 pRBC if Hgb <7  -IV Venofer 200mg     Hyperglycemia, stable  -HbA1c 7.0, monitor BS  -Diabetes Education for lifestyle changes    Disposition  -likely tomorrow, pending    Gwen Dutton MD  5/7/2019  8:09 AM       IM Attending     Pt seen and examined before discharge   Discharge plan and medications were reviewed and agreed as documented by resident.   Time spent for discharge planning more than 30 minutes     Electronically signed by Eze Stauffer MD on 5/7/2019 at 11:40 AM

## 2019-05-07 NOTE — PLAN OF CARE
Problem: Bleeding:  Goal: Will show no signs and symptoms of excessive bleeding  Description  Will show no signs and symptoms of excessive bleeding  5/7/2019 0606 by Juaquin Major RN  Outcome: Met This Shift  5/6/2019 1700 by Jena Crowder RN  Outcome: Ongoing     Problem: Falls - Risk of:  Goal: Will remain free from falls  Description  Will remain free from falls  5/7/2019 0606 by Juaquin Major RN  Outcome: Met This Shift  5/6/2019 1700 by Jena Crowder RN  Outcome: Ongoing  Goal: Absence of physical injury  Description  Absence of physical injury  5/7/2019 0606 by Juaquin Major RN  Outcome: Met This Shift  5/6/2019 1700 by Jena Crowder RN  Outcome: Ongoing

## 2019-05-07 NOTE — PROCEDURES
OB/GYN   Procedure Note    Omar Mandujano  5/5/2019                       Primary Care Physician: No primary care provider on file. Subjective:   Omar Mandujano 41 y.o. female [id number] admitted for symptomatic abnormal uterine bleeding with a Hgb of 4.7 on admission. She has received 5U PRBCs since admission. Due to heavy bleeding that has decreased since administration/starting Provera 20mg TID, decision was made to proceed with endometrial biopsy. Patient's last menstrual period was 05/01/2019. Mishel Mitchell She reports minimal vaginal bleeding at this time. She has only received two doses of the 20mg Provera. Vitals:   Blood pressure 136/80, pulse 91, temperature 97.7 °F (36.5 °C), temperature source Oral, resp. rate 16, height 5' 2\" (1.575 m), weight 283 lb (128.4 kg), last menstrual period 05/01/2019, SpO2 100 %. Procedure: Endometrial biopsy    Indications: Abnormal uterine bleeding  Symptomatic bleeding    Procedure Details: The patient was counseled on the procedure. Risks, benefits and alternatives were reviewed. The patient is aware that this is diagnostic and not curative and a second procedure may be needed. A consent was reviewed and obtained. The patient was positioned comfortably on the exam table. TVUS revealed her uterus was 16.6 cm in length. The uterus was known to be anteverted from prior bimanual exam on admission by myself. There was no adnexal masses and the bladder was smooth, non-tender and without palpable masses. Endometrial Biopsy  A sterile speculum was placed into the vagina and the cervix was identified and stabilized with speculum. Cervix with visible ectropion, but easily obscured with bleeding from cervical os. Wall suction utilized to clear blood from the vaginal vault as it extruded from cervical os. Cervix was cleansed with Betadine and the aspirator was then gently passed into the endometrial cavity. Tissue was obtained and sent to pathology.  Two passes of the aspirator led to retrieval of tissue with the goal that not just blood clot was obtained. The patient tolerated the procedure well. Post procedure restrictions were reviewed and given to the patient. All counts and instruments were correct at the end of the procedure. Total EBL in wall suction around 100mL.       Lab:  Recent Results (from the past 8 hour(s))   Basic Metabolic Panel w/ Reflex to MG    Collection Time: 05/07/19  6:06 AM   Result Value Ref Range    Glucose 135 (H) 70 - 99 mg/dL    BUN 9 6 - 20 mg/dL    CREATININE 0.68 0.50 - 0.90 mg/dL    Bun/Cre Ratio NOT REPORTED 9 - 20    Calcium 7.8 (L) 8.6 - 10.4 mg/dL    Sodium 138 135 - 144 mmol/L    Potassium 3.6 (L) 3.7 - 5.3 mmol/L    Chloride 105 98 - 107 mmol/L    CO2 25 20 - 31 mmol/L    Anion Gap 8 (L) 9 - 17 mmol/L    GFR Non-African American >60 >60 mL/min    GFR African American >60 >60 mL/min    GFR Comment          GFR Staging NOT REPORTED    CBC auto differential    Collection Time: 05/07/19  6:06 AM   Result Value Ref Range    WBC 12.9 (H) 3.5 - 11.0 k/uL    RBC 3.31 (L) 4.0 - 5.2 m/uL    Hemoglobin 7.4 (L) 12.0 - 16.0 g/dL    Hematocrit 23.8 (L) 36 - 46 %    MCV 71.7 (L) 80 - 100 fL    MCH 22.4 (L) 26 - 34 pg    MCHC 31.3 31 - 37 g/dL    RDW 28.9 (H) 11.5 - 14.9 %    Platelets 138 455 - 826 k/uL    MPV 9.2 6.0 - 12.0 fL    NRBC Automated NOT REPORTED per 100 WBC    Differential Type NOT REPORTED     Immature Granulocytes NOT REPORTED 0 %    Absolute Immature Granulocyte NOT REPORTED 0.00 - 0.30 k/uL    WBC Morphology NOT REPORTED     RBC Morphology NOT REPORTED     Platelet Estimate NOT REPORTED     Seg Neutrophils 76 (H) 36 - 66 %    Lymphocytes 16 (L) 24 - 44 %    Monocytes 5 1 - 7 %    Eosinophils % 2 0 - 4 %    Basophils 1 0 - 2 %    Segs Absolute 9.80 (H) 1.3 - 9.1 k/uL    Absolute Lymph # 2.06 1.0 - 4.8 k/uL    Absolute Mono # 0.65 0.1 - 1.3 k/uL    Absolute Eos # 0.26 0.0 - 0.4 k/uL    Basophils # 0.13 0.0 - 0.2 k/uL    Morphology ANISOCYTOSIS PRESENT     Morphology MICROCYTOSIS PRESENT     Morphology HYPOCHROMIA PRESENT     Morphology 1+ ELLIPTOCYTES     Morphology 1+ POLYCHROMASIA    POC Glucose Fingerstick    Collection Time: 05/07/19  7:58 AM   Result Value Ref Range    POC Glucose 112 (H) 65 - 105 mg/dL       Assessment & Plan:  Morales Wang 41 y.o. female [id number]  Patient Active Problem List    Diagnosis Date Noted    Anemia 05/05/2019    Morbid obesity with BMI of 50.0-59.9, adult ([address] 05/05/2019    Abnormal uterine bleeding (AUB) 05/05/2019    Heavy menses 05/05/2019    Hyperglycemia 05/05/2019       The patient was counseled on follow up and restrictions noted. Patient to remain inpatient for now as her Hgb level is labile. Hgb 4.7>6.2>8.1>7.0>7.6>6.3>7.5.  Primary management per medicine team.       Elin Dowd DO  Ob/Gyn Resident  [facility] [address]  5/7/2019, 1:32 PM

## 2019-05-07 NOTE — PROGRESS NOTES
RN paged and spoke with medicine resident. Updated MD that patient's Hgb is 7.0. MD states will place orders to transfuse another unit. Updated MD that patient has concerns that patient has transfer orders to Med surg due to continued need for blood transfusions. MD states ok to keep patient on stepdown until after patient is re-evaluated after unit of blood.

## 2019-05-07 NOTE — PROGRESS NOTES
Progress Note    Date: 5/7/2019  Time: 6:40 AM    Mary Stevens is a 41 y.o. female [id number] HD# 2    Patient was seen and examined. She reports her bleeding was light overnight. She reports minimal spotting. She is asymptomatic currently and resting comfortably. She is ambulating to the bathroom with no issues. She denies any dizziness or lightheadedness. She denies Fever/Chills, Chest Pain, SOB, N/V, or Calf Pain. Vitals:  Vitals:    05/07/19 0220 05/07/19 0236 05/07/19 0239 05/07/19 0447   BP: 119/62 (!) 109/57 110/62 114/63   Pulse: 84 85 85 86   Resp: 16 16 16 16   Temp: 97.8 °F (36.6 °C) 97.8 °F (36.6 °C) 97.8 °F (36.6 °C) 97.4 °F (36.3 °C)   TempSrc: Oral Oral Oral Oral   SpO2: 100% 100% 100% 100%   Weight:       Height:         Physical Exam:  General:  no apparent distress, alert and cooperative  Neurologic:  alert, oriented, normal speech, no focal findings or movement disorder noted  Lungs:  No increased work of breathing, good air exchange, clear to auscultation bilaterally, no crackles or wheezing  Heart:  regular rate and rhythm and no murmur    Abdomen: Abdomen soft, non-tender. BS normal. No masses,  No organomegaly  Extremities:  no calf tenderness, non edematous, SCDs not on and patient is declining at this time     Lab:  Complete Blood Count:   Recent Labs     05/05/19  1139  05/06/19  0545 05/06/19  1630 05/07/19  0035   WBC 9.3  --  9.8  --   --    HGB 4.7*   < > 8.1* 7.6* 6.3*   HCT 15.5*   < > 25.5* 25.0* 21.5*     --  194  --   --     < > = values in this interval not displayed.         PT/INR:    Lab Results   Component Value Date    PROTIME 14.7 05/05/2019    INR 1.1 05/05/2019     PTT:    No results found for: APTT, PTT    Comprehensive Metabolic Profile:   Recent Labs     05/05/19  1139 05/06/19  0545    139   K 4.2 4.5    104   CO2 24 24   BUN 13 9   CREATININE 0.76 0.81   GLUCOSE 235* 135*   CALCIUM 8.3* 8.4*   PROT 6.7  --    LABALBU 3.9  --    BILITOT 0.32  -- ALKPHOS 45  --    AST 13  --    ALT 8  --         Assessment/Plan:  Carry Julia 41 y.o. female [id number] HD# 2, admitted with acute on chronic symptomatic iron deficiency anemia, menorrhagia, Hx AUB    - Doing well, vitals stable   - Patient reports she is asymptomatic currently and bleeding is light    - IM primary. GYN consulted    - Hem/onc consulted     - Hgb: 4.7>6.2>6.9>8.1>7.0>7.6>6.3 most recently. Received 1U pRBCs overnight    - S/p 5U pRBCs.    - IV Venofer x4 doses ordered   - IVF, pepcid   - TVUS: Enlarged uterus 16.6 x 8.7 x 9.5 cm with normal myometrial echotexture w/o discrete fibroids, Endometrial stripe 34.5 mm, Ovaries not seen, no free fluid   - Lab work ordered per primary. CBC/BMP pending    - Continue progesterone tx 20mg TID   - Plan to perform an endometrial biopsy when proper supplies are available    - Declining SCDs. Counseled on importance    - Continue care, please page with any questions. Will follow along.  Patient to have close follow up with GYN as outpatient for further management (medical vs surgical)    BMI 51.9    Enlarged Uterus    - Confirmed on TVUS   - Will need OP GYN F/u    Hyperglycemia   - Glucose 235 on admission   - Hgb A1C 7.0   - DM Ed consulted   - Management per primary       Patient Active Problem List    Diagnosis Date Noted    Anemia 05/05/2019    Morbid obesity with BMI of 50.0-59.9, adult ([address] 05/05/2019    Abnormal uterine bleeding (AUB) 05/05/2019    Heavy menses 05/05/2019    Hyperglycemia 05/05/2019         Chalino John DO  Ob/Gyn Resident  Pager: [phone]  5/7/2019, 6:40 AM

## 2019-05-08 VITALS
RESPIRATION RATE: 17 BRPM | SYSTOLIC BLOOD PRESSURE: 130 MMHG | OXYGEN SATURATION: 100 % | HEIGHT: 62 IN | DIASTOLIC BLOOD PRESSURE: 49 MMHG | BODY MASS INDEX: 52.08 KG/M2 | HEART RATE: 91 BPM | TEMPERATURE: 98.6 F | WEIGHT: 283 LBS

## 2019-05-08 PROBLEM — N93.9 ABNORMAL UTERINE BLEEDING (AUB): Status: RESOLVED | Noted: 2019-05-05 | Resolved: 2019-05-08

## 2019-05-08 PROBLEM — R73.9 HYPERGLYCEMIA: Status: RESOLVED | Noted: 2019-05-05 | Resolved: 2019-05-08

## 2019-05-08 LAB
ABSOLUTE EOS #: 0.41 K/UL (ref 0–0.4)
ABSOLUTE IMMATURE GRANULOCYTE: ABNORMAL K/UL (ref 0–0.3)
ABSOLUTE LYMPH #: 2.07 K/UL (ref 1–4.8)
ABSOLUTE MONO #: 0.69 K/UL (ref 0.1–1.3)
ADAMTS13 ACTIVITY: >100 %
ANION GAP SERPL CALCULATED.3IONS-SCNC: 11 MMOL/L (ref 9–17)
AT-III ACTIVITY: 101 % (ref 83–122)
BASOPHILS # BLD: 1 % (ref 0–2)
BASOPHILS ABSOLUTE: 0.14 K/UL (ref 0–0.2)
BUN BLDV-MCNC: 8 MG/DL (ref 6–20)
BUN/CREAT BLD: ABNORMAL (ref 9–20)
CALCIUM SERPL-MCNC: 7.8 MG/DL (ref 8.6–10.4)
CHLORIDE BLD-SCNC: 105 MMOL/L (ref 98–107)
CO2: 22 MMOL/L (ref 20–31)
CREAT SERPL-MCNC: 0.67 MG/DL (ref 0.5–0.9)
DIFFERENTIAL TYPE: ABNORMAL
EOSINOPHILS RELATIVE PERCENT: 3 % (ref 0–4)
GFR AFRICAN AMERICAN: >60 ML/MIN
GFR NON-AFRICAN AMERICAN: >60 ML/MIN
GFR SERPL CREATININE-BSD FRML MDRD: ABNORMAL ML/MIN/{1.73_M2}
GFR SERPL CREATININE-BSD FRML MDRD: ABNORMAL ML/MIN/{1.73_M2}
GLUCOSE BLD-MCNC: 123 MG/DL (ref 70–99)
HCT VFR BLD CALC: 24.7 % (ref 36–46)
HCT VFR BLD CALC: 25.5 % (ref 36–46)
HEMOGLOBIN: 7.5 G/DL (ref 12–16)
HEMOGLOBIN: 8 G/DL (ref 12–16)
IMMATURE GRANULOCYTES: ABNORMAL %
LYMPHOCYTES # BLD: 15 % (ref 24–44)
MCH RBC QN AUTO: 23.2 PG (ref 26–34)
MCHC RBC AUTO-ENTMCNC: 31.6 G/DL (ref 31–37)
MCV RBC AUTO: 73.5 FL (ref 80–100)
MONOCYTES # BLD: 5 % (ref 1–7)
MORPHOLOGY: ABNORMAL
NRBC AUTOMATED: ABNORMAL PER 100 WBC
PDW BLD-RTO: 28.3 % (ref 11.5–14.9)
PLATELET # BLD: 157 K/UL (ref 150–450)
PLATELET ESTIMATE: ABNORMAL
PMV BLD AUTO: 8.8 FL (ref 6–12)
POTASSIUM SERPL-SCNC: 3.8 MMOL/L (ref 3.7–5.3)
PROTEIN C ACTIVITY: 72 %
PROTEIN S ACTIVITY: 92 % (ref 59–130)
RBC # BLD: 3.47 M/UL (ref 4–5.2)
RBC # BLD: ABNORMAL 10*6/UL
RISTOCETIN CO-FACTOR: 274 % (ref 51–215)
SEG NEUTROPHILS: 76 % (ref 36–66)
SEGMENTED NEUTROPHILS ABSOLUTE COUNT: 10.49 K/UL (ref 1.3–9.1)
SODIUM BLD-SCNC: 138 MMOL/L (ref 135–144)
WBC # BLD: 13.8 K/UL (ref 3.5–11)
WBC # BLD: ABNORMAL 10*3/UL

## 2019-05-08 PROCEDURE — 6370000000 HC RX 637 (ALT 250 FOR IP): Performed by: STUDENT IN AN ORGANIZED HEALTH CARE EDUCATION/TRAINING PROGRAM

## 2019-05-08 PROCEDURE — 6360000002 HC RX W HCPCS: Performed by: STUDENT IN AN ORGANIZED HEALTH CARE EDUCATION/TRAINING PROGRAM

## 2019-05-08 PROCEDURE — 80048 BASIC METABOLIC PNL TOTAL CA: CPT

## 2019-05-08 PROCEDURE — 36415 COLL VENOUS BLD VENIPUNCTURE: CPT

## 2019-05-08 PROCEDURE — 85025 COMPLETE CBC W/AUTO DIFF WBC: CPT

## 2019-05-08 PROCEDURE — 99999 PR OFFICE/OUTPT VISIT,PROCEDURE ONLY: CPT | Performed by: INTERNAL MEDICINE

## 2019-05-08 PROCEDURE — 2580000003 HC RX 258: Performed by: STUDENT IN AN ORGANIZED HEALTH CARE EDUCATION/TRAINING PROGRAM

## 2019-05-08 RX ORDER — MEDROXYPROGESTERONE ACETATE 10 MG/1
10 TABLET ORAL 2 TIMES DAILY
Qty: 42 TABLET | Refills: 0 | Status: SHIPPED | OUTPATIENT
Start: 2019-05-13 | End: 2019-05-23 | Stop reason: SDUPTHER

## 2019-05-08 RX ORDER — MEDROXYPROGESTERONE ACETATE 10 MG/1
20 TABLET ORAL 3 TIMES DAILY
Qty: 32 TABLET | Refills: 0 | Status: SHIPPED | OUTPATIENT
Start: 2019-05-08 | End: 2019-06-11 | Stop reason: ALTCHOICE

## 2019-05-08 RX ORDER — LANOLIN ALCOHOL/MO/W.PET/CERES
325 CREAM (GRAM) TOPICAL
Qty: 90 TABLET | Refills: 5 | Status: SHIPPED | OUTPATIENT
Start: 2019-05-08

## 2019-05-08 RX ORDER — MEDROXYPROGESTERONE ACETATE 10 MG/1
10 TABLET ORAL 2 TIMES DAILY
Qty: 42 TABLET | Refills: 0 | Status: SHIPPED | OUTPATIENT
Start: 2019-05-13 | End: 2019-05-08 | Stop reason: SDUPTHER

## 2019-05-08 RX ORDER — MEDROXYPROGESTERONE ACETATE 10 MG/1
20 TABLET ORAL 3 TIMES DAILY
Qty: 16 TABLET | Refills: 0 | Status: SHIPPED | OUTPATIENT
Start: 2019-05-06 | End: 2019-05-08

## 2019-05-08 RX ORDER — LANOLIN ALCOHOL/MO/W.PET/CERES
325 CREAM (GRAM) TOPICAL
Qty: 90 TABLET | Refills: 5 | Status: SHIPPED | OUTPATIENT
Start: 2019-05-08 | End: 2019-05-08 | Stop reason: SDUPTHER

## 2019-05-08 RX ADMIN — MEDROXYPROGESTERONE ACETATE 20 MG: 2.5 TABLET ORAL at 08:46

## 2019-05-08 RX ADMIN — IRON SUCROSE 200 MG: 20 INJECTION, SOLUTION INTRAVENOUS at 11:08

## 2019-05-08 RX ADMIN — MEDROXYPROGESTERONE ACETATE 20 MG: 2.5 TABLET ORAL at 14:04

## 2019-05-08 RX ADMIN — FAMOTIDINE 20 MG: 20 TABLET ORAL at 08:46

## 2019-05-08 RX ADMIN — SODIUM CHLORIDE: 9 INJECTION, SOLUTION INTRAVENOUS at 03:56

## 2019-05-08 ASSESSMENT — ENCOUNTER SYMPTOMS
DIARRHEA: 0
SINUS PRESSURE: 0
EYES NEGATIVE: 1
WHEEZING: 0
CONSTIPATION: 0
GASTROINTESTINAL NEGATIVE: 1
BACK PAIN: 0
SINUS PAIN: 0
TROUBLE SWALLOWING: 0
VOMITING: 0
SORE THROAT: 0
SHORTNESS OF BREATH: 0
COUGH: 0
NAUSEA: 0

## 2019-05-08 NOTE — PROGRESS NOTES
Progress Note    Date: 5/8/2019  Time: 7:16 AM    Dariel Reese is a 41 y.o. female [id number] HD# 3    Patient was seen and examined. She reports her bleeding was light overnight. She reports minimal spotting. She is asymptomatic currently and resting comfortably. She is ambulating to the bathroom with no issues. She denies any dizziness or lightheadedness. She denies Fever/Chills, Chest Pain, SOB, N/V, or Calf Pain. Vitals:  Vitals:    05/07/19 1755 05/07/19 1845 05/07/19 1950 05/08/19 0030   BP: (!) 144/67 127/65 131/64 (!) 142/67   Pulse: 87 94 94 84   Resp: 16 16 18 20   Temp: 98.5 °F (36.9 °C) 98.5 °F (36.9 °C) 98.1 °F (36.7 °C) 98.1 °F (36.7 °C)   TempSrc: Oral Oral Oral Oral   SpO2: 100% 100% 100% 96%   Weight:       Height:         Physical Exam:  General:  no apparent distress, alert and cooperative  Neurologic:  alert, oriented, normal speech, no focal findings or movement disorder noted  Lungs:  No increased work of breathing, good air exchange, clear to auscultation bilaterally, no crackles or wheezing  Heart:  regular rate and rhythm and no murmur    Abdomen: Abdomen soft, non-tender. BS normal. No masses,  No organomegaly  Extremities:  no calf tenderness, non edematous, SCDs not on and patient is declining at this time     Lab:  Complete Blood Count:   Recent Labs     05/06/19  0545  05/07/19  0606 05/07/19  1542 05/07/19  2331 05/08/19  0504   WBC 9.8  --  12.9*  --   --  13.8*   HGB 8.1*   < > 7.4* 7.0* 7.5* 8.0*   HCT 25.5*   < > 23.8* 22.7* 24.7* 25.5*     --  174  --   --  157    < > = values in this interval not displayed.         PT/INR:    Lab Results   Component Value Date    PROTIME 14.7 05/05/2019    INR 1.1 05/05/2019     PTT:    No results found for: APTT, PTT    Comprehensive Metabolic Profile:   Recent Labs     05/05/19  1139 05/06/19  0545 05/07/19  0606 05/08/19  0504    139 138 138   K 4.2 4.5 3.6* 3.8    104 105 105   CO2 24 24 25 22   BUN 13 9 9 8 CREATININE 0.76 0.81 0.68 0.67   GLUCOSE 235* 135* 135* 123*   CALCIUM 8.3* 8.4* 7.8* 7.8*   PROT 6.7  --   --   --    LABALBU 3.9  --   --   --    BILITOT 0.32  --   --   --    ALKPHOS 45  --   --   --    AST 13  --   --   --    ALT 8  --   --   --         Assessment/Plan:  Irlanda ALEJANDRA Dulce 41 y.o. female [id number] HD# 3, admitted with acute on chronic symptomatic iron deficiency anemia, menorrhagia, Hx AUB    - Doing well, vitals stable   - Patient reports she is asymptomatic currently and bleeding is light    - IM primary. GYN consulted    - Hem/onc consulted     - Hgb: 4.7>6.2>6.9>8.1>7.0>7.6>6.3>7.6>7.4>7.0>7.5>8.0 this morning    - S/p 6U pRBCs.    - IV Venofer x4 doses ordered   - IVF, pepcid   - TVUS: Enlarged uterus 16.6 x 8.7 x 9.5 cm with normal myometrial echotexture w/o discrete fibroids, Endometrial stripe 34.5 mm, Ovaries not seen, no free fluid   - Lab work ordered per primary.    - Continue progesterone tx 20mg TID   - S/p endometrial biopsy on 5/7/19   - Declining SCDs. Counseled on importance    - Continue care, please page with any questions. Will follow along.  Patient to have close follow up with GYN as outpatient for further management (medical vs surgical)    BMI 51.9    Enlarged Uterus    - Confirmed on TVUS   - Will need OP GYN F/u    Hyperglycemia   - Glucose 235 on admission   - Hgb A1C 7.0   - DM Ed consulted   - Management per primary       Patient Active Problem List    Diagnosis Date Noted    History of endometrial biopsy 5/7/19 05/07/2019    Anemia 05/05/2019    Morbid obesity with BMI of 50.0-59.9, adult ([address] 05/05/2019    Abnormal uterine bleeding (AUB) 05/05/2019     Overview Note:     5/7: Endometrial biopsy obtained  Pathology pending      Heavy menses 05/05/2019    Hyperglycemia 05/05/2019         Lauro Stovall DO  Ob/Gyn Resident  Pager: [phone]  5/8/2019, 7:16 AM

## 2019-05-08 NOTE — PROGRESS NOTES
CLINICAL PHARMACY NOTE: MEDS TO [address] Select Patient?: No  Total # of Prescriptions Filled: 1   The following medications were delivered to the patient:  · Medroxyprogesterone  ·   Total # of Interventions Completed: 0  Time Spent (min): 30    Additional Documentation:

## 2019-05-08 NOTE — PROGRESS NOTES
[address] [facility].    PROGRESS NOTE             5/8/2019    7:55 AM    Name:   Eddie Loving  MRN:     [id number]     Acct:      [de-identified]   Room:   2088/2088-01   Day:  3  Admit Date:  5/5/2019 11:11 AM    PCP:  No primary care provider on file. Code Status:  Full Code    Subjective:     C/C:   Chief Complaint   Patient presents with    Vaginal Bleeding     Interval History Status: Stable    Patient was seen and examined at the bedside this morning. VSS, afebrile, on room air  The patient had a Hemoglobin of 7.0 at 1542 and she was transfused 1U pRBC's overnight. Patient continues to be stable at this time with no complaints  She notes that her fatigue has since resolved  She complains of having some light spotting. She denies any chest pain, shortness of breath, any cough, abdominal pain. Denies any dysuria or increased frequency of flank pain or suprapubic tenderness    Brief History:     Per Epic EMR H&P:    \"The patient is a 41 y.o. Non-/non  female who presents withVaginal Bleeding   and she is admitted to the hospital for further evaluation and symptomatic management.     Patient presents with a history of weakness, fatigue and shortness of breath that has occurred for approximately 1 day. She has a history of menorrhagia in the past.     Her current menstrual period started on 5/1/2019. Her prior LMP was at the start of last month. Her menses occur at approximately 30 day intervals. Her first day is heavy, with approximately 8-10 pads soaked through followed by a lighter 1-2 pads/day for 2-3 days after. She had been on Depo for a few years where she did not have any periods. This period was different as she had filled an entire toilet bowl full of blood and she also had clots present.  Menarche is at the age of 13.      She denies any urinary symptoms, including dysuria, frequency, flank pain, suprapubic tenderness. Denies voiding any blood. No use of alcohol or regular NSAID usage. 1+ month ago she had a cough with fever that has not completely resolved.     Patient notes that her grandmother had a history of a bleeding disorder, of which she had been tested in childhood and was found to be negative. Paternal history is positive for Skin Ca. Patient denies any history of smoking/exposure, last alcoholic drink was 1+ years ago and denies use of any drugs. Patient denies any history of STI's. \"    Review of Systems:     Review of Systems   Constitutional: Negative for chills, fatigue and fever. HENT: Negative. Negative for sinus pressure, sinus pain, sore throat and trouble swallowing. Eyes: Negative. Respiratory: Negative for cough, shortness of breath and wheezing. Cardiovascular: Negative for chest pain, palpitations and leg swelling. Gastrointestinal: Negative. Negative for constipation, diarrhea, nausea and vomiting. Endocrine: Negative for polydipsia, polyphagia and polyuria. Genitourinary: Positive for vaginal bleeding (Occured yesterday post EBx, slight spotting present). Negative for difficulty urinating, dysuria, flank pain, hematuria, urgency, vaginal discharge and vaginal pain. Musculoskeletal: Negative. Negative for back pain, gait problem and neck pain. Skin: Negative. Negative for rash and wound. Neurological: Negative for syncope, weakness, light-headedness and headaches. Psychiatric/Behavioral: Negative. Negative for confusion and self-injury. The patient is not nervous/anxious. Medications:      Allergies:  No Known Allergies    Current Meds:   Scheduled Meds:    iron sucrose  200 mg Intravenous Q24H    sodium chloride  250 mL Intravenous Once    sodium chloride  250 mL Intravenous Once    medroxyPROGESTERone  20 mg Oral TID    sodium chloride  250 mL Intravenous Once    sodium chloride flush  10 mL Intravenous 2 times per day    famotidine  20 mg Oral BID  sodium chloride  250 mL Intravenous Once     Continuous Infusions:     PRN Meds: sodium chloride flush, ondansetron, potassium chloride **OR** potassium alternative oral replacement **OR** potassium chloride, magnesium sulfate, polyethylene glycol, acetaminophen    Data:     Past Medical History:   has no past medical history on file. Social History:   reports that she has never smoked. She has never used smokeless tobacco. She reports that she drinks alcohol. She reports that she does not use drugs. Family History: History reviewed. No pertinent family history. Vitals:  BP (!) 142/67   Pulse 84   Temp 98.1 °F (36.7 °C) (Oral)   Resp 20   Ht 5' 2\" (1.575 m)   Wt 283 lb (128.4 kg)   LMP 2019   SpO2 96%   BMI 51.76 kg/m²   Temp (24hrs), Av.3 °F (36.8 °C), Min:97.7 °F (36.5 °C), Max:98.8 °F (37.1 °C)    Recent Labs     19  0758   POCGLU 112*       I/O(24Hr):   No intake or output data in the 24 hours ending 19 0755    Labs:    CBC with Differential:    Lab Results   Component Value Date    WBC 13.8 2019    RBC 3.47 2019    HGB 8.0 2019    HCT 25.5 2019     2019    MCV 73.5 2019    MCH 23.2 2019    MCHC 31.6 2019    RDW 28.3 2019    LYMPHOPCT 15 2019    MONOPCT 5 2019    BASOPCT 1 2019    MONOSABS 0.69 2019    LYMPHSABS 2.07 2019    EOSABS 0.41 2019    BASOSABS 0.14 2019    DIFFTYPE NOT REPORTED 2019     Hemoglobin/Hematocrit:    Lab Results   Component Value Date    HGB 8.0 2019    HCT 25.5 2019     BMP:    Lab Results   Component Value Date     2019    K 3.8 2019     2019    CO2 22 2019    BUN 8 2019    LABALBU 3.9 2019    CREATININE 0.67 2019    CALCIUM 7.8 2019    GFRAA >60 2019    LABGLOM >60 2019    GLUCOSE 123 2019         Lab Results   Component Value Date/Time    SPECIAL NOT REPORTED 05/05/2019 07:10 PM     Lab Results   Component Value Date/Time    CULTURE NO SIGNIFICANT GROWTH 05/05/2019 07:10 PM       Radiology:    No results found. Physical Examination:        Physical Exam   Constitutional: She is oriented to person, place, and time. She appears well-developed and well-nourished. No distress. HENT:   Head: Normocephalic and atraumatic. Eyes: Pupils are equal, round, and reactive to light. Conjunctivae are normal. Right eye exhibits no discharge. Left eye exhibits no discharge. Neck: Normal range of motion. Neck supple. No tracheal deviation present. No thyromegaly present. Cardiovascular: Normal rate, regular rhythm and normal heart sounds. No murmur heard. Pulmonary/Chest: Effort normal and breath sounds normal. No respiratory distress. She has no wheezes. Abdominal: Soft. Bowel sounds are normal. She exhibits no distension. There is no tenderness. Musculoskeletal: Normal range of motion. She exhibits no edema or tenderness. Neurological: She is alert and oriented to person, place, and time. Skin: Skin is warm and dry. Capillary refill takes less than 2 seconds. She is not diaphoretic. There is pallor. Psychiatric: She has a normal mood and affect. Vitals reviewed.     Assessment:        Primary Problem  Anemia    Active Hospital Problems    Diagnosis Date Noted    History of endometrial biopsy 5/7/19 [Z92.89] 05/07/2019    Anemia [D64.9] 05/05/2019    Morbid obesity with BMI of 50.0-59.9, adult ([facility] [E66.01, Z68.43] 05/05/2019    Abnormal uterine bleeding (AUB) [N93.9] 05/05/2019    Heavy menses [N92.0] 05/05/2019    Hyperglycemia [R73.9] 05/05/2019       Plan:        Microcytic hypochromic Iron deficiency anemia, chronic and likely due to Menorrhagia  -ObGyn following   -Provera 20mg TID for now   -s/p Endometrial Biopsy, Pathology pending  -Monitor H/H q8h for now   -Transfuse 1 pRBC if Hgb <7  -IV Venofer 200mg, will transition to PO Ferrous sulfate TID on d/c     Hyperglycemia, stable  -HbA1c 7.0, monitor BS  -Diabetes Education for lifestyle changes    Disposition  -likely today    Deep Loaiza MD  5/8/2019  7:55 AM     IM Attending     Pt seen and examined before discharge   Discharge plan and medications were reviewed and agreed as documented by resident.   Time spent for discharge planning more than 30 minutes     Electronically signed by Rachel Shane MD on 5/8/2019 at 11:12 AM

## 2019-05-08 NOTE — PROGRESS NOTES
Today's Date: 5/7/2019  Patient Name: Jazmin Yin  Date of admission: 5/5/2019 11:11 AM  Patient's age: 41 y. o., 1980  Admission Dx: Anemia [D64.9]    Reason for Consult: john, Hgb 4.7  Requesting Physician: Arie Cerda MD    CHIEF COMPLAINT:    Chief Complaint   Patient presents with    Vaginal Bleeding     SUBJECTIVE:   Patient seen and examined  She had endometrial biopsy today  Tolerating iron well  Hemoglobin stable at 7.0  No fever or chills  HISTORY OF PRESENT ILLNESS:    This is a 42 YO female with history heavy periods/menorrhagia was a admitted with fatigue, SOB, tiredness. ON admission Hb noted 4.7 with very low iron levels. Received 4 units PRBc and now hb 7.6. She is seen by Gyn and [place] Reason was attempted. Started on provera. She is also receiving IV venofer. NO family history of bleeding disorder, no night sweats wt loss fever chills. No blood in stool. Medications:    Prior to Admission medications    Medication Sig Start Date End Date Taking? Authorizing Provider   cephALEXin (KEFLEX) 500 MG capsule Take 1 capsule by mouth 3 times daily.  12/2/14   Gena Harris MD     Current Facility-Administered Medications   Medication Dose Route Frequency Provider Last Rate Last Dose    0.9 % sodium chloride bolus  250 mL Intravenous Once Deanna Álvarez MD        0.9 % sodium chloride bolus  250 mL Intravenous Once Lanre Melchor MD        medroxyPROGESTERone (PROVERA) tablet 20 mg  20 mg Oral TID Patricia Walsh, DO   20 mg at 05/07/19 1450    0.9 % sodium chloride bolus  250 mL Intravenous Once Turner Cruz MD        sodium chloride flush 0.9 % injection 10 mL  10 mL Intravenous 2 times per day Bismark Bethea MD   10 mL at 05/06/19 2310    sodium chloride flush 0.9 % injection 10 mL  10 mL Intravenous PRN Bismark Bethea MD        ondansetron [facility]) injection 4 mg  4 mg Intravenous Q6H PRN Bismark Bethea MD        0.9 % sodium chloride infusion   Intravenous Continuous Patrice Castro  mL/hr at 05/07/19 1448      potassium chloride (KLOR-CON M) extended release tablet 40 mEq  40 mEq Oral PRN Patrice Castro MD        Or    potassium bicarb-citric acid (EFFER-K) effervescent tablet 40 mEq  40 mEq Oral PRN Patrice Castro MD        Or   Schilling potassium chloride 10 mEq/100 mL IVPB (Peripheral Line)  10 mEq Intravenous PRN Patrice Castro MD        magnesium sulfate 1 g in dextrose 5% 100 mL IVPB  1 g Intravenous PRN Patrice Castro MD        polyethylene glycol (GLYCOLAX) packet 17 g  17 g Oral Daily PRN Patrice Castro MD        famotidine (PEPCID) tablet 20 mg  20 mg Oral BID Patrice Castro MD   20 mg at 05/07/19 0807    acetaminophen (TYLENOL) tablet 650 mg  650 mg Oral Q4H PRN Patrice Castro MD   650 mg at 05/06/19 0738    iron sucrose (VENOFER) 200 mg in sodium chloride 0.9 % 100 mL IVPB  200 mg Intravenous Q24H Patrice Castro MD   Stopped at 05/07/19 1640    0.9 % sodium chloride bolus  250 mL Intravenous Once Alcira Ryan MD           Allergies:  Patient has no known allergies. Social History:   reports that she has never smoked. She has never used smokeless tobacco. She reports that she drinks alcohol. She reports that she does not use drugs. Family History: family history is not on file. Family history was reviewed and no pertinent family history noted. REVIEW OF SYSTEMS:    Constitutional: ++fatigue. No fever or chills.  No night sweats, no weight loss   Eyes: No eye discharge, double vision, or eye pain   HEENT: negative for sore mouth, sore throat, hoarseness and voice change   Respiratory: negative for cough , sputum, dyspnea, wheezing, hemoptysis, chest pain   Cardiovascular: negative for chest pain, dyspnea, palpitations, orthopnea, PND   Gastrointestinal: negative for nausea, vomiting, diarrhea, constipation, abdominal pain, Dysphagia, hematemesis and hematochezia   Genitourinary: negative for frequency, dysuria, nocturia, urinary incontinence, and hematuria Integument: negative for rash, skin lesions, bruises. Hematologic/Lymphatic: negative for easy bruising, bleeding, lymphadenopathy, or petechiae   Endocrine: negative for heat or cold intolerance,weight changes, change in bowel habits and hair loss   Musculoskeletal: negative for myalgias, arthralgias, pain, joint swelling,and bone pain   Neurological: negative for headaches, dizziness, seizures, weakness, numbness    PHYSICAL EXAM:      /64   Pulse 94   Temp 98.1 °F (36.7 °C) (Oral)   Resp 18   Ht 5' 2\" (1.575 m)   Wt 283 lb (128.4 kg)   LMP 2019   SpO2 100%   BMI 51.76 kg/m²    Temp (24hrs), Av.1 °F (36.7 °C), Min:97.4 °F (36.3 °C), Max:98.8 °F (37.1 °C)    General appearance - well appearing, no in pain or distress   Mental status - alert and cooperative   Eyes - pupils equal and reactive, extraocular eye movements intact   Ears - bilateral TM's and external ear canals normal   Mouth - mucous membranes moist, pharynx normal without lesions   Neck - supple, no significant adenopathy   Lymphatics - no palpable lymphadenopathy, no hepatosplenomegaly   Chest - clear to auscultation, no wheezes, rales or rhonchi, symmetric air entry   Heart - normal rate, regular rhythm, normal S1, S2, no murmurs  Abdomen - soft, nontender, nondistended, no masses or organomegaly   Neurological - alert, oriented, normal speech, no focal findings or movement disorder noted   Musculoskeletal - no joint tenderness, deformity or swelling   Extremities - peripheral pulses normal, no pedal edema, no clubbing or cyanosis   Skin - normal coloration and turgor, no rashes, no suspicious skin lesions noted ,    DATA:    Labs:   CBC:   Recent Labs     19  0545  19  0619  1542   WBC 9.8  --  12.9*  --    HGB 8.1*   < > 7.4* 7.0*   HCT 25.5*   < > 23.8* 22.7*     --  174  --     < > = values in this interval not displayed.      BMP:   Recent Labs     19  0545 19  0606    138 K 4.5 3.6*   CO2 24 25   BUN 9 9   CREATININE 0.81 0.68   LABGLOM >60 >60   GLUCOSE 135* 135*     PT/INR:   Recent Labs     05/05/19  1139   PROTIME 14.7*   INR 1.1       IMAGING DATA:      Primary Problem  Anemia    Active Hospital Problems    Diagnosis Date Noted    History of endometrial biopsy 5/7/19 [Z92.89] 05/07/2019    Anemia [D64.9] 05/05/2019    Morbid obesity with BMI of 50.0-59.9, adult ([facility] [E66.01, Z68.43] 05/05/2019    Abnormal uterine bleeding (AUB) [N93.9] 05/05/2019    Heavy menses [N92.0] 05/05/2019    Hyperglycemia [R73.9] 05/05/2019     IMPRESSION:   1. Anemia  2.  iron deficiency  3. Menorrhogia    RECOMMENDATIONS:  1. Continue IV venofer  2. Check HB tomorrow and PRBC if hb less than 7  3. Had endometrial biopsy today  4. GYN managing menorrhagia  5. Will need hematology follow up as o/p      Discussed with patient and Nurse. Thank you for asking us to see this patient.     Andrew Holt MD  Hematologist/Medical Oncologist  Cell: [phone]

## 2019-05-08 NOTE — PLAN OF CARE
Problem: Falls - Risk of:  Goal: Will remain free from falls  Description  Will remain free from falls  5/8/2019 0310 by Shantel Keen RN  Outcome: Met This Shift     Problem: Falls - Risk of:  Goal: Absence of physical injury  Description  Absence of physical injury  5/8/2019 0310 by Shantel Keen RN  Outcome: Met This Shift     Problem: Bleeding:  Goal: Will show no signs and symptoms of excessive bleeding  Description  Will show no signs and symptoms of excessive bleeding  5/8/2019 0310 by Shantel Keen RN  Outcome: Ongoing     Pt ambulates per self without difficulty. Pt continues to bleed vaginally, hbg stable at this time, will continue h/h monitoring.

## 2019-05-09 LAB
ABO/RH: NORMAL
ANTIBODY SCREEN: NEGATIVE
ARM BAND NUMBER: NORMAL
BLD PROD TYP BPU: NORMAL
BLOOD BANK COMMENT: NORMAL
CROSSMATCH RESULT: NORMAL
DISPENSE STATUS BLOOD BANK: NORMAL
ESTRADIOL LEVEL: 143 PG/ML
ESTROGEN TOTAL: 286 PG/ML
ESTRONE: 143 PG/ML
EXPIRATION DATE: NORMAL
HCT VFR BLD CALC: NORMAL % (ref 36–46)
HEMOGLOBIN: NORMAL G/DL (ref 12–16)
PATHOLOGIST REVIEW: NORMAL
TRANSFUSION STATUS: NORMAL
UNIT DIVISION: 0
UNIT NUMBER: NORMAL

## 2019-05-10 ENCOUNTER — TELEPHONE (OUTPATIENT)
Dept: OBGYN | Age: 39
End: 2019-05-10

## 2019-05-10 LAB
ANDROSTENEDIONE: 1.06 NG/ML (ref 0.26–2.14)
SURGICAL PATHOLOGY REPORT: NORMAL

## 2019-05-13 ENCOUNTER — OFFICE VISIT (OUTPATIENT)
Dept: OBGYN | Age: 39
End: 2019-05-13

## 2019-05-13 ENCOUNTER — TELEPHONE (OUTPATIENT)
Dept: OBGYN | Age: 39
End: 2019-05-13

## 2019-05-13 VITALS
HEIGHT: 62 IN | DIASTOLIC BLOOD PRESSURE: 96 MMHG | HEART RATE: 102 BPM | WEIGHT: 293 LBS | SYSTOLIC BLOOD PRESSURE: 138 MMHG | BODY MASS INDEX: 53.92 KG/M2

## 2019-05-13 DIAGNOSIS — Z92.89 HISTORY OF BLOOD TRANSFUSION: ICD-10-CM

## 2019-05-13 DIAGNOSIS — C55 ENDOMETRIOID ADENOCARCINOMA OF UTERUS (HCC): Primary | ICD-10-CM

## 2019-05-13 PROCEDURE — 99203 OFFICE O/P NEW LOW 30 MIN: CPT | Performed by: STUDENT IN AN ORGANIZED HEALTH CARE EDUCATION/TRAINING PROGRAM

## 2019-05-13 NOTE — PROGRESS NOTES
Mary Stevens  5/13/2019      Mary Stevens is a 41 y.o. female [id number]      The patient was seen today. She was here to follow-up regarding her labs and diagnostics ordered at her last visit for the diagnosis of:    ICD-10-CM    1. Endometrioid adenocarcinoma of uterus [facility]) C55 Renata Santos MD, Gynecologic Oncology, [facility]       She does not have any specific chief complaint today. Her bowels are regular and she is voiding without difficulty. No past medical history on file. Past Surgical History:   Procedure Laterality Date    DILATION AND CURETTAGE OF UTERUS      x2         No family history on file. Denies family hx of endometrial, uterine, ovarian, breast or colon cancer    Social History     Tobacco Use    Smoking status: Never Smoker    Smokeless tobacco: Never Used   Substance Use Topics    Alcohol use: Yes     Comment: occasional    Drug use: No         MEDICATIONS:  Current Outpatient Medications   Medication Sig Dispense Refill    medroxyPROGESTERone (PROVERA) 10 MG tablet Take 2 tablets by mouth 3 times daily for 16 doses 32 tablet 0    medroxyPROGESTERone (PROVERA) 10 MG tablet Take 1 tablet by mouth 2 times daily for 21 days 42 tablet 0    ferrous sulfate 325 (65 Fe) MG EC tablet Take 1 tablet by mouth 3 times daily (with meals) 90 tablet 5     No current facility-administered medications for this visit. ALLERGIES:  Allergies as of 05/13/2019    (No Known Allergies)         Blood pressure (!) 138/96, pulse 102, height 5' 2\" (1.575 m), weight (!) 301 lb (136.5 kg), last menstrual period 05/01/2019. Abdomen: Soft non-tender; good bowel sounds. No guarding, rebound or rigidity. No CVA tenderness bilaterally.     Extremities: No calf tenderness, DTR 2/4, and No edema bilaterally    Pelvic: Declined       Diagnostics:  Us Non Ob Transvaginal    Result Date: 5/6/2019  EXAMINATION: PELVIC ULTRASOUND 5/6/2019 TECHNIQUE: Transvaginal pelvic ultrasound was performed. Color Doppler evaluation was performed. COMPARISON: None HISTORY: ORDERING SYSTEM PROVIDED HISTORY: PAIN, PELVIS TECHNOLOGIST PROVIDED HISTORY: Ordering Physician Provided Reason for Exam: AUB Acuity: Acute Type of Exam: Initial FINDINGS: Measurements: Uterus:  16.6 x 8.7 x 9.5 cm. Endometrial stripe:  34.5 mm. Right Ovary:  Not visualized. Left Ovary:  Not visualized. Ultrasound Findings: Uterus: Uterus is enlarged and demonstrates normal myometrial echotexture without discrete fibroids. Endometrial stripe: Endometrial stripe is abnormally thickened. Slight undulation at the myometrial endometrial junction is noted. Right Ovary: Right ovary is not visualized. Left Ovary:  Left ovary is not visualized. Free Fluid: No evidence of free fluid. Enlarged uterus with abnormally thickened endometrium suggestive of endometrial pathology. Neither ovary is visualized. Us Pelvis Complete    Result Date: 5/6/2019  EXAMINATION: PELVIC ULTRASOUND 5/6/2019 TECHNIQUE: Transvaginal pelvic ultrasound was performed. Color Doppler evaluation was performed. COMPARISON: None HISTORY: ORDERING SYSTEM PROVIDED HISTORY: PAIN, PELVIS TECHNOLOGIST PROVIDED HISTORY: Ordering Physician Provided Reason for Exam: AUB Acuity: Acute Type of Exam: Initial FINDINGS: Measurements: Uterus:  16.6 x 8.7 x 9.5 cm. Endometrial stripe:  34.5 mm. Right Ovary:  Not visualized. Left Ovary:  Not visualized. Ultrasound Findings: Uterus: Uterus is enlarged and demonstrates normal myometrial echotexture without discrete fibroids. Endometrial stripe: Endometrial stripe is abnormally thickened. Slight undulation at the myometrial endometrial junction is noted. Right Ovary: Right ovary is not visualized. Left Ovary:  Left ovary is not visualized. Free Fluid: No evidence of free fluid. Enlarged uterus with abnormally thickened endometrium suggestive of endometrial pathology. Neither ovary is visualized.          Lab Results:  Results for Phosphatase 45 35 - 104 U/L    ALT 8 5 - 33 U/L    AST 13 <32 U/L    Total Bilirubin 0.32 0.3 - 1.2 mg/dL    Total Protein 6.7 6.4 - 8.3 g/dL    Alb 3.9 3.5 - 5.2 g/dL    Albumin/Globulin Ratio NOT REPORTED 1.0 - 2.5    GFR Non-African American >60 >60 mL/min    GFR African American >60 >60 mL/min    GFR Comment          GFR Staging NOT REPORTED    HCG Qualitative, Serum   Result Value Ref Range    hCG Qual NEGATIVE NEGATIVE   Protime-INR   Result Value Ref Range    Protime 14.7 (H) 11.8 - 14.6 sec    INR 1.1    Path Review, Smear   Result Value Ref Range    Pathologist Review       Reviewed by pathologist:  Milton Terrell. Luna Ratliff D.O.    ANDROSTENEDIONE   Result Value Ref Range    Androstenedione 1.063 0.260 - 2.140 ng/mL   [id number] ACTIVITY   Result Value Ref Range    [id number] Activity >100 >=61 %   Antithrombin III Activity   Result Value Ref Range    AT-III Activity 101 83 - 122 %   FACTOR 8 RISTOCETIN COFACTOR   Result Value Ref Range    Ristocetin Co-Factor 274 (H) 51 - 215 %   Protein S Functional   Result Value Ref Range    Protein S Activity 92 59 - 130 %   Protein C Functional   Result Value Ref Range    Protein C Activity 72 (L) >80 %   ESTROGENS, FRACTIONATED   Result Value Ref Range    Estradiol 143.0 pg/mL    Estrone 143.0 pg/mL    Estrogen Total 286.0 pg/mL   Estradiol   Result Value Ref Range    Estradiol 124 27 - 314 pg/mL   Iron and TIBC   Result Value Ref Range    Iron 17 (L) 37 - 145 ug/dL    TIBC 406 250 - 450 ug/dL    Iron Saturation 4 (L) 20 - 55 %    UIBC 389 (H) 112 - 347 ug/dL   Ferritin   Result Value Ref Range    Ferritin 3 (L) 13 - 377 ug/L   Follicle Stimulating Hormone   Result Value Ref Range    FSH 4.7 1.7 - 21.5 U/L   T3, Free   Result Value Ref Range    T3, Free 2.27 2.02 - 4.43 pg/mL   Hemoglobin A1C   Result Value Ref Range    Hemoglobin A1C 7.0 (H) 4.0 - 6.0 %    Estimated Avg Glucose 154 mg/dL   Luteinizing Hormone   Result Value Ref Range    LH 5.7 1.0 - 95.6 U/L   TSH without Reflex   Result Value Ref Range    TSH 2.08 0.30 - 5.00 mIU/L   Vitamin B12 & Folate   Result Value Ref Range    Vitamin B-12 262 232 - 1245 pg/mL    Folate 9.9 >4.8 ng/mL   Gliadin Deaminidated Peptide AB IGA   Result Value Ref Range    Gliadin Deaminidated Peptide AB IGA 0.5 <7.0 U/mL   Gliadin Deaminidated Peptide AB IGG   Result Value Ref Range    Gliadin Deaminidated Peptide AB IGG <0.4 <7.0 U/mL   IgA   Result Value Ref Range    IgA 79 70 - 400 mg/dL   Prolactin   Result Value Ref Range    Prolactin 17.67 4.79 - 23.30 ug/L   TTIGA with Reflex to AEMAT   Result Value Ref Range    TISSUE TRANSGLUTAMINASE IGA 0.1 <7.0 U/mL   Tissue Transglutaminase, IgG   Result Value Ref Range    TISSUE TRANSGLUTAMINASE ANTIBODY IGG <0.6 <7.0 U/mL   Haptoglobin   Result Value Ref Range    Haptoglobin 234 (H) 30 - 200 mg/dL   Hgb/Hct   Result Value Ref Range    Hemoglobin 6.2 (LL) 12.0 - 16.0 g/dL    Hematocrit 20.8 (L) 36 - 46 %   Urinalysis Reflex to Culture   Result Value Ref Range    Color, UA RED (A) YELLOW    Turbidity UA CLOUDY (A) CLEAR    Glucose, Ur NEGATIVE NEGATIVE    Bilirubin Urine NEGATIVE NEGATIVE    Ketones, Urine NEGATIVE NEGATIVE    Specific Gravity, UA 1.019 1.000 - 1.030    Urine Hgb LARGE (A) NEGATIVE    pH, UA 7.5 5.0 - 8.0    Protein, UA 2+ (A) NEGATIVE    Urobilinogen, Urine Normal Normal    Nitrite, Urine NEGATIVE NEGATIVE    Leukocyte Esterase, Urine MOD (A) NEGATIVE    Urinalysis Comments NOT REPORTED    Basic Metabolic Panel w/ Reflex to MG   Result Value Ref Range    Glucose 135 (H) 70 - 99 mg/dL    BUN 9 6 - 20 mg/dL    CREATININE 0.81 0.50 - 0.90 mg/dL    Bun/Cre Ratio NOT REPORTED 9 - 20    Calcium 8.4 (L) 8.6 - 10.4 mg/dL    Sodium 139 135 - 144 mmol/L    Potassium 4.5 3.7 - 5.3 mmol/L    Chloride 104 98 - 107 mmol/L    CO2 24 20 - 31 mmol/L    Anion Gap 11 9 - 17 mmol/L    GFR Non-African American >60 >60 mL/min    GFR African American >60 >60 mL/min    GFR Comment          GFR Staging NOT REPORTED    CBC auto differential   Result Value Ref Range    WBC 9.8 3.5 - 11.0 k/uL    RBC 3.67 (L) 4.0 - 5.2 m/uL    Hemoglobin 8.1 (L) 12.0 - 16.0 g/dL    Hematocrit 25.5 (L) 36 - 46 %    MCV 69.4 (L) 80 - 100 fL    MCH 22.0 (L) 26 - 34 pg    MCHC 31.6 31 - 37 g/dL    RDW 28.0 (H) 11.5 - 14.9 %    Platelets 496 624 - 238 k/uL    MPV 8.5 6.0 - 12.0 fL    NRBC Automated NOT REPORTED per 100 WBC    Differential Type NOT REPORTED     Immature Granulocytes NOT REPORTED 0 %    Absolute Immature Granulocyte NOT REPORTED 0.00 - 0.30 k/uL    WBC Morphology NOT REPORTED     RBC Morphology NOT REPORTED     Platelet Estimate NOT REPORTED     Seg Neutrophils 67 (H) 36 - 66 %    Lymphocytes 23 (L) 24 - 44 %    Monocytes 6 1 - 7 %    Eosinophils % 3 0 - 4 %    Basophils 1 0 - 2 %    Segs Absolute 6.57 1.3 - 9.1 k/uL    Absolute Lymph # 2.25 1.0 - 4.8 k/uL    Absolute Mono # 0.59 0.1 - 1.3 k/uL    Absolute Eos # 0.29 0.0 - 0.4 k/uL    Basophils # 0.10 0.0 - 0.2 k/uL    Morphology ANISOCYTOSIS PRESENT     Morphology HYPOCHROMIA PRESENT     Morphology MICROCYTOSIS PRESENT     Morphology 1+ ELLIPTOCYTES     Morphology SLT POLYCHROMASIA    T4, Free   Result Value Ref Range    Thyroxine, Free 1.14 0.93 - 1.70 ng/dL   ADRIANNA Screen with Reflex   Result Value Ref Range    ADRIANNA NEGATIVE NEGATIVE   Microscopic Urinalysis   Result Value Ref Range    -          WBC, UA 10 TO 20 /HPF    RBC, UA TOO NUMEROUS TO COUNT /HPF    Casts UA NOT REPORTED /LPF    Crystals UA NOT REPORTED None /HPF    Epithelial Cells UA 2 TO 5 /HPF    Renal Epithelial, Urine NOT REPORTED 0 /HPF    Bacteria, UA FEW (A) None    Mucus, UA NOT REPORTED None    Trichomonas, UA NOT REPORTED None    Amorphous, UA NOT REPORTED None    Other Observations UA NOT REPORTED NOT REQ.     Yeast, UA NOT REPORTED None   Hgb/Hct   Result Value Ref Range    Hemoglobin 6.9 (LL) 12.0 - 16.0 g/dL    Hematocrit 22.5 (L) 36 - 46 %   Hgb/Hct   Result Value Ref Range    Hemoglobin 7.6 (L) 12.0 - 16.0 g/dL    Hematocrit 25.0 (L) 36 - 46 %   Basic Metabolic Panel w/ Reflex to MG   Result Value Ref Range    Glucose 135 (H) 70 - 99 mg/dL    BUN 9 6 - 20 mg/dL    CREATININE 0.68 0.50 - 0.90 mg/dL    Bun/Cre Ratio NOT REPORTED 9 - 20    Calcium 7.8 (L) 8.6 - 10.4 mg/dL    Sodium 138 135 - 144 mmol/L    Potassium 3.6 (L) 3.7 - 5.3 mmol/L    Chloride 105 98 - 107 mmol/L    CO2 25 20 - 31 mmol/L    Anion Gap 8 (L) 9 - 17 mmol/L    GFR Non-African American >60 >60 mL/min    GFR African American >60 >60 mL/min    GFR Comment          GFR Staging NOT REPORTED    CBC auto differential   Result Value Ref Range    WBC 12.9 (H) 3.5 - 11.0 k/uL    RBC 3.31 (L) 4.0 - 5.2 m/uL    Hemoglobin 7.4 (L) 12.0 - 16.0 g/dL    Hematocrit 23.8 (L) 36 - 46 %    MCV 71.7 (L) 80 - 100 fL    MCH 22.4 (L) 26 - 34 pg    MCHC 31.3 31 - 37 g/dL    RDW 28.9 (H) 11.5 - 14.9 %    Platelets 578 577 - 562 k/uL    MPV 9.2 6.0 - 12.0 fL    NRBC Automated NOT REPORTED per 100 WBC    Differential Type NOT REPORTED     Immature Granulocytes NOT REPORTED 0 %    Absolute Immature Granulocyte NOT REPORTED 0.00 - 0.30 k/uL    WBC Morphology NOT REPORTED     RBC Morphology NOT REPORTED     Platelet Estimate NOT REPORTED     Seg Neutrophils 76 (H) 36 - 66 %    Lymphocytes 16 (L) 24 - 44 %    Monocytes 5 1 - 7 %    Eosinophils % 2 0 - 4 %    Basophils 1 0 - 2 %    Segs Absolute 9.80 (H) 1.3 - 9.1 k/uL    Absolute Lymph # 2.06 1.0 - 4.8 k/uL    Absolute Mono # 0.65 0.1 - 1.3 k/uL    Absolute Eos # 0.26 0.0 - 0.4 k/uL    Basophils # 0.13 0.0 - 0.2 k/uL    Morphology ANISOCYTOSIS PRESENT     Morphology MICROCYTOSIS PRESENT     Morphology HYPOCHROMIA PRESENT     Morphology 1+ ELLIPTOCYTES     Morphology 1+ POLYCHROMASIA    Hemoglobin and Hematocrit, Blood   Result Value Ref Range    Hemoglobin 7.0 (LL) 12.0 - 16.0 g/dL    Hematocrit 22.7 (L) 36 - 46 %   Hemoglobin and Hematocrit, Blood   Result Value Ref Range    Hemoglobin 7.5 (L) 12.0 - 16.0 g/dL    Hematocrit 24.7 (L) 36 - 46 %   Surgical Pathology   Result Value Ref Range    Surgical Pathology Report       [address] [facility]  [address] [place], [address] [facility]  [phone]  Fax: [phone]  SURGICAL PATHOLOGY REPORT    Patient Name: Yolette Clarke  MR#: [id number]  Specimen #[id number]    Procedure/Addendum  ADDENDUM (SCM)     Date Ordered:     5/10/2019     Status: Signed Out       Date Complete:     5/10/2019     By: Anne Steward M.D. Date Reported:     5/10/2019       ADDENDUM COMMENT  Sections were also submitted for immunostains for estrogen and  progesterone receptors. >95% of the lesional cells show a strongly  positive reaction for both estrogen and progesterone receptors. The  controls for these immunostains were satisfactory. Anne Steward M.D. Final Diagnosis  - - - - -ALERT- - - - -ALERT- - - - -ALERT- - - - -    ENDOMETRIAL BIOPSY:         FOCI OF ENDOMETRIOID ADENOCARCINOMA, FIGO GRADE 1 ARISING IN A  BACKGROUND OF ATYPICAL COMPLEX HYPERPLASIA. Diagnosis Comment  These sections were also examined by a second pathologist for quali ty  assurance. Sections have also been submitted for immunostains for  estrogen and progesterone receptors. Their evaluation will be report  in an addendum. Anne Steward M.D.  **Electronically Signed Out**         jt/5/7/2019    Clinical Information      Source:  A: Endometrial bx    Gross Description  The specimen received in formalin is labeled \"endometrial biopsy\". It  consists of multiple fragments of pink and reddish tan soft glistening  tissue aggregating to approximately 2 x 2 x 1 cm. Entirely submitted  in two cassettes for microscopic examination. Microscopic Description  Four H&E glass slides are received. Microscopic examination is  performed. Sections show multiple fragments of endometrium.   Many of  these show endometrial glands with infoldings and outpouching and are  closely approximated with small amounts of stroma in between. The  lining cells are atypical and show nuclear stratification. Still in  other foci, the glandular elements show back-to-back  apposition and  are lined atypical nuclei many of which have a rounded appearance.          Basic Metabolic Panel w/ Reflex to MG   Result Value Ref Range    Glucose 123 (H) 70 - 99 mg/dL    BUN 8 6 - 20 mg/dL    CREATININE 0.67 0.50 - 0.90 mg/dL    Bun/Cre Ratio NOT REPORTED 9 - 20    Calcium 7.8 (L) 8.6 - 10.4 mg/dL    Sodium 138 135 - 144 mmol/L    Potassium 3.8 3.7 - 5.3 mmol/L    Chloride 105 98 - 107 mmol/L    CO2 22 20 - 31 mmol/L    Anion Gap 11 9 - 17 mmol/L    GFR Non-African American >60 >60 mL/min    GFR African American >60 >60 mL/min    GFR Comment          GFR Staging NOT REPORTED    CBC auto differential   Result Value Ref Range    WBC 13.8 (H) 3.5 - 11.0 k/uL    RBC 3.47 (L) 4.0 - 5.2 m/uL    Hemoglobin 8.0 (L) 12.0 - 16.0 g/dL    Hematocrit 25.5 (L) 36 - 46 %    MCV 73.5 (L) 80 - 100 fL    MCH 23.2 (L) 26 - 34 pg    MCHC 31.6 31 - 37 g/dL    RDW 28.3 (H) 11.5 - 14.9 %    Platelets 222 313 - 727 k/uL    MPV 8.8 6.0 - 12.0 fL    NRBC Automated NOT REPORTED per 100 WBC    Differential Type NOT REPORTED     Immature Granulocytes NOT REPORTED 0 %    Absolute Immature Granulocyte NOT REPORTED 0.00 - 0.30 k/uL    WBC Morphology NOT REPORTED     RBC Morphology NOT REPORTED     Platelet Estimate NOT REPORTED     Seg Neutrophils 76 (H) 36 - 66 %    Lymphocytes 15 (L) 24 - 44 %    Monocytes 5 1 - 7 %    Eosinophils % 3 0 - 4 %    Basophils 1 0 - 2 %    Segs Absolute 10.49 (H) 1.3 - 9.1 k/uL    Absolute Lymph # 2.07 1.0 - 4.8 k/uL    Absolute Mono # 0.69 0.1 - 1.3 k/uL    Absolute Eos # 0.41 (H) 0.0 - 0.4 k/uL    Basophils # 0.14 0.0 - 0.2 k/uL    Morphology ANISOCYTOSIS PRESENT     Morphology HYPOCHROMIA PRESENT     Morphology MICROCYTOSIS PRESENT     Morphology SLT POLYCHROMASIA     Morphology SLT ELLIPTOCYTES    Hemoglobin and Hematocrit, Blood   Result Value Ref Range    Hemoglobin DISREGARD RESULTS. CLERICAL ERROR. 12.0 - 16.0 g/dL    Hematocrit DISREGARD RESULTS. CLERICAL ERROR. 36 - 46 %   Path Review, Smear   Result Value Ref Range    Pathologist Review NOT REPORTED    POC Glucose Fingerstick   Result Value Ref Range    POC Glucose 112 (H) 65 - 105 mg/dL   TYPE AND SCREEN   Result Value Ref Range    Expiration Date 05/08/2019     Arm Band Number [id number]     ABO/Rh A POSITIVE     Antibody Screen NEGATIVE     Blood Bank Comment ABORH CONFIRMED AS A POS: 403  Results Verified       Unit Number [id number]     Product Code Leukocyte Reduced Red Cell     Unit Divison 00     Dispense Status TRANSFUSED     Transfusion Status OK TO TRANSFUSE     Crossmatch Result COMPATIBLE     Unit Number [id number]     Product Code Leukocyte Reduced Red Cell     Unit Divison 00     Dispense Status TRANSFUSED     Transfusion Status OK TO TRANSFUSE     Crossmatch Result COMPATIBLE     Unit Number [id number]     Product Code Leukocyte Reduced Red Cell     Unit Divison 00     Dispense Status TRANSFUSED     Transfusion Status OK TO TRANSFUSE     Crossmatch Result COMPATIBLE     Unit Number [id number]     Product Code Leukocyte Reduced Red Cell     Unit Divison 00     Dispense Status TRANSFUSED     Transfusion Status OK TO TRANSFUSE     Crossmatch Result COMPATIBLE     Unit Number [id number]     Product Code Leukocyte Reduced Red Cell     Unit Divison 00     Dispense Status TRANSFUSED     Transfusion Status OK TO TRANSFUSE     Crossmatch Result COMPATIBLE     Unit Number [id number]     Product Code Leukocyte Reduced Red Cell     Unit Divison 00     Dispense Status TRANSFUSED     Transfusion Status OK TO TRANSFUSE     Crossmatch Result COMPATIBLE            Assessment:   Diagnosis Orders   1.  Endometrioid adenocarcinoma of uterus [facility])  Tari Fleischer, MD, Gynecologic Oncology, [facility] Complaint   Patient presents with   [facility] Results     EMB

## 2019-05-13 NOTE — PROGRESS NOTES
Date: 2019  Time: 5:29 PM      Patient Name: Kennedi Reilly  Patient : 1980  Room/Bed: Room/bed info not found  Admission Date/Time: No admission date for patient encounter. Attending Physician Statement  I have discussed the care of Kennedi Reilly, including pertinent history and exam findings with the resident. I have reviewed and edited their note in the electronic medical record. The key elements of all parts of the encounter have been performed/reviewed by me . I agree with the assessment, plan and orders as documented by the resident. The level of care submitted represents to the best of my ability the care documented in the medical record today. GC Modifier. This service has been performed in part by a resident under the direction of a teaching physician. Patient was seen for F/U after EMB. Adenocarcinoma stage 1 was the diagnosis. Referral to gyn oncologist initiated.     Attending's Name:  Hallie Davenport MD

## 2019-05-13 NOTE — TELEPHONE ENCOUNTER
Called Irlanda to schedule appointment to follow up to review endometrial biopsy results. Yury Barbosa was finding it difficult to schedule an appointment and only had availability in a week or two secondary to her work scheduled. Emphasized earlier appointment to review results. I discussed that I would like to reveal her results in person. Patient requested I reveal results over the phone, stating, \"Is it bad, is it cancer? \". Discussed that biopsy revealed adenocarcinoma, FIGO Grade 1. Patient appropriately tearful and stated she could come in at 11:00 am for an appointment and will let her work/boss know she has an appointment.       Valdez Marshall DO  Ob/Gyn Resident  [facility], [facility]  5/13/2019, 8:22 AM

## 2019-05-13 NOTE — LETTER
[facility] PHYSICIANS  [facility] [address]  [facility] [address]  Dept: [phone]  Dept Fax: [phone]      To whom it may concern,   Please excuse Royer Landry, 1980 from work today as she was at a doctor's appointment    Please don't hesitate to call with any questions or concerns      Sincerely,               Fredrick Azevedo, DO  Ob/Gyn Resident  [facility], [address]  5/13/2019, 11:56 AM

## 2019-05-22 ENCOUNTER — TELEPHONE (OUTPATIENT)
Dept: ONCOLOGY | Age: 39
End: 2019-05-22

## 2019-05-22 NOTE — TELEPHONE ENCOUNTER
Name: Sarita Dumont  : 1980  MRN: [id number]    Oncology Navigation- Initial Note:    Intake-  Contact Type: Telephone  Notes: Introductory phone call made to patient, no answer, VM left, instructed patient writer assigned as nurse navigator & requested call writer back @ [phone]. [address] written materials along with writer's business card mailed to patient. Will continue to follow.     Electronically signed by Stanley Mohs, RN on 2019 at 10:16 AM

## 2019-05-23 ENCOUNTER — OFFICE VISIT (OUTPATIENT)
Dept: GYNECOLOGIC ONCOLOGY | Age: 39
End: 2019-05-23

## 2019-05-23 VITALS
HEART RATE: 100 BPM | BODY MASS INDEX: 53.92 KG/M2 | SYSTOLIC BLOOD PRESSURE: 130 MMHG | HEIGHT: 62 IN | DIASTOLIC BLOOD PRESSURE: 78 MMHG | WEIGHT: 293 LBS | OXYGEN SATURATION: 98 % | TEMPERATURE: 98.8 F

## 2019-05-23 DIAGNOSIS — C55 ENDOMETRIOID ADENOCARCINOMA OF UTERUS (HCC): Primary | ICD-10-CM

## 2019-05-23 PROCEDURE — 99204 OFFICE O/P NEW MOD 45 MIN: CPT | Performed by: OBSTETRICS & GYNECOLOGY

## 2019-05-23 RX ORDER — MEDROXYPROGESTERONE ACETATE 10 MG/1
10 TABLET ORAL 3 TIMES DAILY
Qty: 90 TABLET | Refills: 0 | Status: ON HOLD | OUTPATIENT
Start: 2019-05-23 | End: 2019-06-25

## 2019-05-23 ASSESSMENT — ENCOUNTER SYMPTOMS
GASTROINTESTINAL NEGATIVE: 1
EYES NEGATIVE: 1

## 2019-05-23 NOTE — PATIENT INSTRUCTIONS
after surgery, the bowel is usually less active. You may not have a regular bowel movement right away, pending on pre-op and bowel pain or pain medication use. Narcotic pain medications (Percocet, Vicodin, Hydrocodone, or Oxycodone) will increase constipation. Regular bowel movement may be less frequent. If constipation should occur:  · Drink more fluids  · Continue to take a stool softener such as Colace until constipation resolves. · Take a mild laxative such as Milk of Magnesia    Swelling:  Mild abdominal swelling can occur following surgery due to slowing of the bowels and gas used to distend the abdomen during surgery. Swelling of the hands and lower extremities is common due to fluids given during surgery. Swelling of the face can occur due to positioning during surgery. If you have selling of the calves that is persistent or associated with redness, call our office. Activity:  If is normal to feel tired for a few days after surgery. Listen to your body and do not overdo it. · Walking is encouraged immediately after surgery, as tolerated. You should NOT be bedridden after surgery as continued movement will prevent prolonged recovery times due to \"deconditioning\". ·  If you could climb stairs un-aided prior to surgery you may resume climbing stairs after discharge following your procedure. · No strenuous activities such as heavy lifting (greater than 10 pounds or a gallon of milk), pushing or pulling for six weeks. · You may slowly increase your cardiovascular exercise after 2 weeks. Abdominal exercise should be avoided for 6 weeks. · Do Not drive while taking prescription pain medication or if your level of discomfort could inhibit your ability to operate a motor vehicle safely. · You may shower the day after surgery. Pat incisions dry. Do not rub incisions with washcloth or towel. Keep your incisions as dry as possible. · You may take a bath after six weeks.   You must also wait 6 weeks to go into a swimming pool, hot tub, or the ocean. Vaginal Bleeding:  Light vaginal bleeding, spotting, or brown discharge for up to 6 weeks is common. Note: If you have heavy, bright red vaginal bleeding, call our office. Bleeding that fills a pad in one hour is considered to be heavy bleeding. Sexual [place]:  Avoid placing anything in the vagina for 8 weeks (ie. .Tampons, Douching, and Sexual intercourse). Vaginal closure disruption can occur if sexual intercourse is resumed before healing is complete and will require additional surgery, which can lead to shortening of the vagina and painful intercourse. Follow Up Appointment:  Your follow up appointment will be scheduled on the same day we schedule your surgery. If one is not, please call the office when you get home to schedule. Call the office if you Experiencing:  ·  A fever higher than 100.4 Fahrenheit  · Increasing pain not controlled by pain medications  · Inability to eat or drink without vomiting  · Shortness of breath  · Inability to empty your bladder  · Redness and tenderness at the incision site, or a large amount of drainage  · Heavy, bright red vaginal bleeding or foul odor from discharge. You can expect to have a small amount of reddish-brown colored discharge up to 6 weeks. DO NOT BE ALARMED BY THIS. If you fell you need to be seen emergently, please go to the Emergency Department, if possible, where your surgery was performed so that our physicians may care for you. Any questions regarding your surgery or post-operative recovery should be directed to the staff a the [address] [place] [address] Gynecology Oncology at [phone], rather than your primary care physician.            [address] [facility], [facility], [address]  [place] [address]    Dulcolax Bowel Preparation for Surgery    A few days before surgery purchase Dulcolax Laxative Tablets (generic name is Bisacodyl Tablets)    2 Day before Surgery:  · You may take your regular mediations, unless otherwise directed. · Drink clear liquids. Drink as much clear liquids as you can; this will prevent dehydration. · Do not eat any solid foods. · At 12pm [facility], take 4 Dulcolax laxative tablets   · Continue to drink at least 8 ounces of clear liquid every 1-2 hours until bedtime. Day of Surgery:  · You may take your regular medications, unless otherwise directed. · You may continue to drink clear liquids up to 4 hours prior to surgery. Clear Liquid Diet:  · Bouillon (Chicken or beef; no meat or noodles), coffee and tea (you may use sugar/sweetener), gelatin, popsicles, water, all fruit juices (no pulp), all sports drinks (Gatorade, Power Mario, Lemonade, or Lime mario (no pulp), sherbet, clear soft drinks. NOTICE TO DIABETIC PATIENTS:  Make sure our office is aware that you are diabetic. Contact your primary care physician or endocrinologist for instructions on how to use your insulin or diabetic medications.

## 2019-05-23 NOTE — PROGRESS NOTES
[address] [facility], [facility], [address] [facility] [address]      I am seeing Guerita Youngblood for New Patient at the request of Dr. Jackson Reeves. Chief Complaint:  Endometrial cancer    HPI  She is a 41 y.o.  1, para 3, Ab2 female who is being referred for a recent diagnosis of adenocarcinoma of the endometrium, grade 1. The patient stated that she had been having regular menstrual cycles lasting 3 days with the first day \"very heavy\" up until approximately 1-2 months ago. She had no bleeding in between. She then developed sudden severe bleeding and was admitted to [facility].  She was found to be very anemic with a hemoglobin of 4.1. She received 7 units of packed red blood cells and several doses of IV Venofer as well as oral iron. A gynecology consultation was obtained as well as a hematology oncology consultation. The patient was examined and an endometrial biopsy was performed that revealed endometrial adenocarcinoma, grade 1. A transvaginal pelvic ultrasound was performed on May 6, 2019 and revealed an enlarged uterus measuring 16.6 x 8.7 x 9.5 cm. The endometrial stripe was 3.45 cm. Neither right nor left ovary was visualized. There was no free fluid noted in the pelvis. The patient has been referred to [facility] gynecologic oncology for further workup and definitive therapy. The patient states that since her D&C she has had very little bleeding but just \"daily spotting\". She is taking ferrous sulfate orally 3 times a day. Her last hemoglobin was 8.1. The patient has lifelong obesity and was found to have a hemoglobin A1c of 7.0. She has no other known medical problems. The patient has had no surgeries. Review of Systems  I have personally reviewed and agree with the review of systems done by my ancillary staff in the [facility] documentation.     OBJECTIVE:  Vitals:    19 0925   BP: (!) 150/90   Pulse: 100   Temp: 98.8 °F (37.1 °C)   TempSrc: Oral   SpO2: 98%   Weight: 297 lb 12.8 oz (135.1 kg)   Height: 5' 2\" (1.575 m)       General: Alert and oriented x3, no acute distress    HEENT:  EOM intact, SHARIF, no cervical or supraclavicular lymphadenopathy. No Thyromegaly. Heart: Auscultation of heart revels a regular rate with no murmur, gallops, or rubs. Lungs:  Clear bilaterally to auscultation to the bases. CVA: No tenderness. Abdomen: Morbidly obese. (BMI 54.47-height 5 feet 2 inches, weight 301 pounds). Palpation reveals no herniations. No ascites. No organomegaly. No masses. No tenderness. No inguinal adenopathy. No rashes are noted. Lower Extremities:  No lymphedema, no calf tenderness, no musculoskeletal deformities. Pelvic Exam:  Normal external genitalia. No lesions are seen. Normal-appearing urethra, lower vagina, perineum and anus. Speculum examination was performed with a large speculum there was a slight amount of blood at the vaginal apex and some friable-appearing material free-floating. No discharge. No odor. The cervix is pulled high up above the symphysis pubis and only the lower lip was visualized. Bimanual examination was performed. This was severely limited because of the patient's obesity. We were able to palpate an enlarged uterus perhaps 16 weeks size pregnancy it is anteverted . No separate adnexal pathology was palpated because of her obesity. Family History   Problem Relation Age of Onset    Cancer Maternal Grandmother        Assessment:  1. Endometrioid adenocarcinoma of uterus [facility])        Discussion:  A discussion of her situation and various management options was discussed with the patient. She is already on Provera 3 times a day and is on oral iron. I would not recommend observation or hormonal therapy alone. We discussed radiation therapy briefly as an option, however I feel the patient is healthy enough for a surgical approach.   This would be difficult because of her size and the patient is well aware of this. She also is probably an undiagnosed diabetic and will need to have an insulin sliding scale at least while she is in the hospital.    The patient has no medical physician and we will need to obtain one for medical clearance before the surgery and one to follow after the surgery as well. I mention the various surgical approaches and I feel that she may be a candidate for robotic minimally invasive approach. If we are unable to deliver the uterus vaginally then we would place it in the bag and remove it through a smaller abdominal incision. I informed her that there is always the possibility of a traditional vertical laparotomy incision. All questions were answered to her satisfaction. The benefits of the procedure would be to rid her of the cancer along with its consequences area this would also help her reverse her anemia because of the bleeding. The risks of the procedure are as delineated below and were also reviewed with the patient and her grandmother. All questions were answered to her satisfaction. Plan: Robotic laparoscopic modified radical hysterectomy, bilateral salpingo-nephrectomy, peritoneal washings for cytology, sentinel lymph node mapping and sentinel lymph node biopsies. Possible staging laparotomy. INFORMED CONSENT:  We discussed options including but not limited to observation, hormonal therapy, radiation therapy and various surgical approaches. The patient prefers a surgical approach and I feel that she may be a candidate for a robotic laparoscopic minimally invasive approach. The patient prefers this if possible. Benefits of the procedure(s) include but not limited to treatment, possible staging of precancerous/cancerous lesions and/or improvement in systems and quality of life.   The procedure(s) were explained in great detail along with all the possible risks and complications including, but not limited to blood loss requiring transfusions, DVT requiring blood thinning agents, injury to surrounding structures including bladder, bowel ureters, etc., as well as the possibility of infection requiring prophylactic antibiotics. I spent 45 minutes providing care to the patient and >50% of the time was spent counseling and conoordinating care, discussing the patient's current situation, reviewing her options, counseling and education her and answering her questions. All of the patient's pertinent images, labs and previous records and procedures were reviewed.     Electronically signed by Kindra Herrera MD on 5/23/19 at 10:49 AM

## 2019-05-23 NOTE — PROGRESS NOTES
Review of Systems   Constitutional: Positive for fatigue. HENT:  Negative. Eyes: Negative. Cardiovascular: Positive for leg swelling. Gastrointestinal: Negative. Endocrine: Negative. Genitourinary: Negative. Musculoskeletal: Negative. Skin: Negative. Neurological: Negative. Hematological: Negative. Psychiatric/Behavioral: Negative.

## 2019-05-30 ENCOUNTER — OFFICE VISIT (OUTPATIENT)
Dept: INTERNAL MEDICINE | Age: 39
End: 2019-05-30

## 2019-05-30 VITALS
BODY MASS INDEX: 53.92 KG/M2 | SYSTOLIC BLOOD PRESSURE: 127 MMHG | DIASTOLIC BLOOD PRESSURE: 79 MMHG | HEART RATE: 95 BPM | WEIGHT: 293 LBS | HEIGHT: 62 IN

## 2019-05-30 DIAGNOSIS — C54.1 ENDOMETRIAL CARCINOMA (HCC): ICD-10-CM

## 2019-05-30 DIAGNOSIS — E11.9 TYPE 2 DIABETES MELLITUS WITHOUT COMPLICATION, WITHOUT LONG-TERM CURRENT USE OF INSULIN (HCC): Primary | ICD-10-CM

## 2019-05-30 DIAGNOSIS — Z01.818 PRE-OP EVALUATION: ICD-10-CM

## 2019-05-30 DIAGNOSIS — Z13.220 SCREENING FOR HYPERLIPIDEMIA: ICD-10-CM

## 2019-05-30 DIAGNOSIS — D50.0 IRON DEFICIENCY ANEMIA DUE TO CHRONIC BLOOD LOSS: ICD-10-CM

## 2019-05-30 PROCEDURE — 99203 OFFICE O/P NEW LOW 30 MIN: CPT | Performed by: INTERNAL MEDICINE

## 2019-05-30 PROCEDURE — 99211 OFF/OP EST MAY X REQ PHY/QHP: CPT | Performed by: INTERNAL MEDICINE

## 2019-05-30 RX ORDER — METFORMIN HYDROCHLORIDE 500 MG/1
500 TABLET, EXTENDED RELEASE ORAL
Qty: 30 TABLET | Refills: 3 | Status: SHIPPED | OUTPATIENT
Start: 2019-05-30 | End: 2019-06-11 | Stop reason: ALTCHOICE

## 2019-05-30 ASSESSMENT — PATIENT HEALTH QUESTIONNAIRE - PHQ9
SUM OF ALL RESPONSES TO PHQ QUESTIONS 1-9: 0
2. FEELING DOWN, DEPRESSED OR HOPELESS: 0
SUM OF ALL RESPONSES TO PHQ QUESTIONS 1-9: 0
SUM OF ALL RESPONSES TO PHQ9 QUESTIONS 1 & 2: 0
1. LITTLE INTEREST OR PLEASURE IN DOING THINGS: 0

## 2019-05-30 NOTE — PROGRESS NOTES
[facility]    New Patient Note/History and Physical    Date of patient's visit: 5/30/2019  Name:  Rachelle Gallo  Primary Care Physician: Claudia Pillai MD    Reason for visit: First Visit, establish care     HISTORY OF PRESENTING ILLNESS:    History was obtained from the patient. Rachelle Gallo is a 41 y.o. is here to establish care. Pt was recently admitted to [facility] for severe anemia and meonorragia. Workup showed she had Grade 1 endometrial carcinoma. She is here for pre op clearance for surgery, management of diabetes and anemia. She has type 2 DM and is not on any treatment. She is willing to start Metformin. She is due to get pre op workup on June 11. PAST MEDICAL HISTORY:    No past medical history on file. PAST SURGICAL HISTORY:          Procedure Laterality Date    DILATION AND CURETTAGE OF UTERUS      x2       ALLERGIES:    No Known Allergies      HOME MEDICATION:      Current Outpatient Medications on File Prior to Visit   Medication Sig Dispense Refill    medroxyPROGESTERone (PROVERA) 10 MG tablet Take 1 tablet by mouth 3 times daily 90 tablet 0    ferrous sulfate 325 (65 Fe) MG EC tablet Take 1 tablet by mouth 3 times daily (with meals) 90 tablet 5    medroxyPROGESTERone (PROVERA) 10 MG tablet Take 2 tablets by mouth 3 times daily for 16 doses 32 tablet 0     No current facility-administered medications on file prior to visit. SOCIAL HISTORY:    TOBACCO:   reports that she has never smoked. She has never used smokeless tobacco.  ETOH:   reports that she drinks alcohol. DRUGS:  reports that she does not use drugs.   OCCUPATION:      FAMILY HISTORY:          Problem Relation Age of Onset    Cancer Maternal Grandmother        REVIEW OF SYSTEMS:    ENT: negative  Respiratory: no cough, shortness of breath, or wheezing  Cardiovascular: no chest pain or dyspnea on exertion  Gastrointestinal: no abdominal pain, black or bloody

## 2019-05-30 NOTE — PROGRESS NOTES
Visit Information    Have you changed or started any medications since your last visit including any over-the-counter medicines, vitamins, or herbal medicines? no   Have you stopped taking any of your medications? Is so, why? -  no  Are you having any side effects from any of your medications? - no    Have you seen any other physician or provider since your last visit? Yes, onocology  Have you had any other diagnostic tests since your last visit?  no   Have you been seen in the emergency room and/or had an admission in a hospital since we last saw you?  no   Have you had your routine dental cleaning in the past 6 months?  no     Do you have an active MyChart account? If no, what is the barrier?   No:     Patient Care Team:  Claudia Pillai MD as PCP - General (Internal Medicine)  Carley Matute RN as Nurse Navigator (Oncology)    Medical History Review  Past Medical, Family, and Social History reviewed and does contribute to the patient presenting condition    Health Maintenance   Topic Date Due    Pneumococcal 0-64 years Vaccine (1 of 1 - PPSV23) 10/11/1987    Diabetic foot exam  10/11/1991    Diabetic retinal exam  10/11/1991    Lipid screen  10/11/1991    HIV screen  10/11/1996    Diabetic microalbuminuria test  10/11/1999    Hepatitis B Vaccine (1 of 3 - Risk 3-dose series) 10/11/2000    DTaP/Tdap/Td vaccine (1 - Tdap) 10/11/2000    Cervical cancer screen  10/11/2002    Flu vaccine (Season Ended) 09/01/2019    A1C test (Diabetic or Prediabetic)  05/05/2020    Varicella Vaccine  Discontinued

## 2019-05-30 NOTE — PATIENT INSTRUCTIONS
Patient Education        Type 2 Diabetes: Care Instructions  Your Care Instructions    Type 2 diabetes is a disease that develops when the body's tissues cannot use insulin properly. Over time, the pancreas cannot make enough insulin. Insulin is a hormone that helps the body's cells use sugar (glucose) for energy. It also helps the body store extra sugar in muscle, fat, and liver cells. Without insulin, the sugar cannot get into the cells to do its work. It stays in the blood instead. This can cause high blood sugar levels. A person has diabetes when the blood sugar stays too high too much of the time. Over time, diabetes can lead to diseases of the heart, blood vessels, nerves, kidneys, and eyes. You may be able to control your blood sugar by losing weight, eating a healthy diet, and getting daily exercise. You may also have to take insulin or other diabetes medicine. Follow-up care is a key part of your treatment and safety. Be sure to make and go to all appointments. Call your doctor if you are having problems. It's also a good idea to know your test results and keep a list of the medicines you take. How can you care for yourself at home? · Keep your blood sugar at a target level (which you set with your doctor). ? Eat a good diet that spreads carbohydrate throughout the day. Carbohydrate--the body's main source of fuel--affects blood sugar more than any other nutrient. Carbohydrate is in fruits, vegetables, milk, and yogurt. It also is in breads, cereals, vegetables such as potatoes and corn, and sugary foods such as candy and cakes. ? Aim for 30 minutes of exercise on most, preferably all, days of the week. Walking is a good choice. You also may want to do other activities, such as running, swimming, cycling, or playing tennis or team sports. If your doctor says it's okay, do muscle-strengthening exercises at least 2 times a week. ? Take your medicines exactly as prescribed.  Call your doctor if you think you are having a problem with your medicine. You will get more details on the specific medicines your doctor prescribes. · Check your blood sugar as often as your doctor recommends. It is important to keep track of any symptoms you have, such as low blood sugar. Also tell your doctor if you have any changes in your activities, diet, or insulin use. · Talk to your doctor before you start taking aspirin every day. Aspirin can help certain people lower their risk of a heart attack or stroke. But taking aspirin isn't right for everyone, because it can cause serious bleeding. · Do not smoke. If you need help quitting, talk to your doctor about stop-smoking programs and medicines. These can increase your chances of quitting for good. · Keep your cholesterol and blood pressure at normal levels. You may need to take one or more medicines to reach your goals. Take them exactly as directed. Do not stop or change a medicine without talking to your doctor first.  When should you call for help? Call 911 anytime you think you may need emergency care. For example, call if:    · You passed out (lost consciousness), or you suddenly become very sleepy or confused. (You may have very low blood sugar.)    Call your doctor now or seek immediate medical care if:    · Your blood sugar is 300 mg/dL or is higher than the level your doctor has set for you.     · You have symptoms of low blood sugar, such as:  ? Sweating. ? Feeling nervous, shaky, and weak. ? Extreme hunger and slight nausea. ? Dizziness and headache.  ? Blurred vision. ? Confusion.    Watch closely for changes in your health, and be sure to contact your doctor if:    · You often have problems controlling your blood sugar.     · You have symptoms of long-term diabetes problems, such as:  ? New vision changes. ? New pain, numbness, or tingling in your hands or feet. ? Skin problems. Where can you learn more? Go to https://chmaycoeb.health-Chewse. org and sign in to your [employer] account. Enter C553 in the MRO box to learn more about \"Type 2 Diabetes: Care Instructions. \"     If you do not have an account, please click on the \"Sign Up Now\" link. Current as of: July 25, 2018  Content Version: 12.0  © 9891-8264 [employer]. Care instructions adapted under license by [facility] ([facility]). If you have questions about a medical condition or this instruction, always ask your healthcare professional. [place] [address] any warranty or liability for your use of this information. Patient Education        Learning About Metformin for Type 2 Diabetes  Introduction    Metformin (such as Glucophage) is a medicine used to treat type 2 diabetes. It helps keep blood sugar levels on target. You may have tried to eat a healthy diet, lose weight, and get more exercise to keep your blood sugar in your target range. If those things do not help, you may take a medicine called metformin. It helps your body use insulin. This can help you control your blood sugar. You might take it on its own or with other medicines. When taken on its own, metformin should not cause low blood sugar or weight gain. Example  · Metformin (Glucophage)  Possible side effects  Common side effects include:  · Short-term nausea. · Not feeling hungry. · Diarrhea. · Increased gas in your belly. · A metallic taste. You may have side effects or reactions not listed here. Check the information that comes with your medicine. What to know about taking this medicine  · Metformin does not usually cause low blood sugar. But you may get a low blood sugar when you take metformin and you exercise hard, drink alcohol, or you do not eat enough food. · Sometimes metformin is combined with other diabetes medicine. Some of these can cause low blood sugar.   · If you need a test that uses a dye or you need to have surgery, be sure to tell all of your doctors that you take metformin. You may have to stop taking it before and after the test or surgery. · Over time, blood levels of vitamin B12 can decrease in some people who take metformin. Your body needs this B vitamin to make blood cells. It also keeps your nervous system healthy. If you have been taking metformin for more than a few years, ask your doctor if you need a B12 blood test to measure the amount of vitamin B12 in your blood. · Be safe with medicines. Take your medicines exactly as prescribed. Call your doctor if you think you are having a problem with your medicine. · Check with your doctor or pharmacist before you use any other medicines. This includes over-the-counter medicines. Make sure your doctor knows all of the medicines, vitamins, herbal products, and supplements you take. Taking some medicines together can cause problems. Where can you learn more? Go to https://infotope GmbHpejosuéeb.PagerDuty. org and sign in to your [employer] account. Enter J360 in the Revisu box to learn more about \"Learning About Metformin for Type 2 Diabetes. \"     If you do not have an account, please click on the \"Sign Up Now\" link. Current as of: July 25, 2018  Content Version: 12.0  © 3955-1547 [employer]. Care instructions adapted under license by [facility] ([facility]). If you have questions about a medical condition or this instruction, always ask your healthcare professional. [address] any warranty or liability for your use of this information. Patient Education         Type 2 Diabetes (02:24)  Your health professional recommends that you watch this short online health video. Learn what type 2 diabetes is and what it means for you and your life. How to watch the video    Scan the QR code   OR Visit the website    https://hwi. se/r/Eqkztvdgxzrb4   Current as of: July 25, 2018  Content Version: 12.0  © 2006-2019 [employer]D.  Care instructions adapted under license by [facility] ([facility]). If you have questions about a medical condition or this instruction, always ask your healthcare professional. [place] [address] any warranty or liability for your use of this information. Follow-up appointment scheduled for 9/3/19, AVS given to patient. Medications e-scribe to pharmacy of pt's choice. Labs given to patient, they will have them done before their next visit.      abdias

## 2019-06-11 ENCOUNTER — HOSPITAL ENCOUNTER (OUTPATIENT)
Age: 39
Discharge: HOME | End: 2019-06-11

## 2019-06-11 ENCOUNTER — HOSPITAL ENCOUNTER (OUTPATIENT)
Dept: GENERAL RADIOLOGY | Age: 39
Discharge: HOME | End: 2019-06-13

## 2019-06-11 ENCOUNTER — HOSPITAL ENCOUNTER (OUTPATIENT)
Dept: PREADMISSION TESTING | Age: 39
Discharge: HOME | End: 2019-06-15

## 2019-06-11 VITALS
RESPIRATION RATE: 18 BRPM | HEIGHT: 62 IN | DIASTOLIC BLOOD PRESSURE: 71 MMHG | TEMPERATURE: 98.8 F | OXYGEN SATURATION: 100 % | HEART RATE: 73 BPM | BODY MASS INDEX: 53.92 KG/M2 | WEIGHT: 293 LBS | SYSTOLIC BLOOD PRESSURE: 128 MMHG

## 2019-06-11 DIAGNOSIS — C55 ENDOMETRIOID ADENOCARCINOMA OF UTERUS (HCC): ICD-10-CM

## 2019-06-11 LAB
-: NORMAL
ABSOLUTE EOS #: 0.18 K/UL (ref 0–0.44)
ABSOLUTE IMMATURE GRANULOCYTE: 0 K/UL (ref 0–0.3)
ABSOLUTE LYMPH #: 2.46 K/UL (ref 1.1–3.7)
ABSOLUTE MONO #: 0.53 K/UL (ref 0.1–1.2)
ALBUMIN SERPL-MCNC: 4.4 G/DL (ref 3.5–5.2)
ALBUMIN/GLOBULIN RATIO: 1.3 (ref 1–2.5)
ALP BLD-CCNC: 39 U/L (ref 35–104)
ALT SERPL-CCNC: 10 U/L (ref 5–33)
AMORPHOUS: NORMAL
ANION GAP SERPL CALCULATED.3IONS-SCNC: 14 MMOL/L (ref 9–17)
AST SERPL-CCNC: 13 U/L
BACTERIA: NORMAL
BASOPHILS # BLD: 0 % (ref 0–2)
BASOPHILS ABSOLUTE: 0 K/UL (ref 0–0.2)
BILIRUB SERPL-MCNC: 0.46 MG/DL (ref 0.3–1.2)
BILIRUBIN URINE: NEGATIVE
BUN BLDV-MCNC: 10 MG/DL (ref 6–20)
BUN/CREAT BLD: ABNORMAL (ref 9–20)
CA 125: 42 U/ML
CALCIUM SERPL-MCNC: 9.3 MG/DL (ref 8.6–10.4)
CASTS UA: NORMAL /LPF (ref 0–8)
CHLORIDE BLD-SCNC: 105 MMOL/L (ref 98–107)
CO2: 21 MMOL/L (ref 20–31)
COLOR: YELLOW
COMMENT UA: ABNORMAL
CREAT SERPL-MCNC: 0.73 MG/DL (ref 0.5–0.9)
CRYSTALS, UA: NORMAL /HPF
DIFFERENTIAL TYPE: ABNORMAL
EOSINOPHILS RELATIVE PERCENT: 2 % (ref 1–4)
EPITHELIAL CELLS UA: NORMAL /HPF (ref 0–5)
GFR AFRICAN AMERICAN: >60 ML/MIN
GFR NON-AFRICAN AMERICAN: >60 ML/MIN
GFR SERPL CREATININE-BSD FRML MDRD: ABNORMAL ML/MIN/{1.73_M2}
GFR SERPL CREATININE-BSD FRML MDRD: ABNORMAL ML/MIN/{1.73_M2}
GLUCOSE BLD-MCNC: 103 MG/DL (ref 70–99)
GLUCOSE URINE: NEGATIVE
HCG(URINE) PREGNANCY TEST: NEGATIVE
HCT VFR BLD CALC: 33.2 % (ref 36.3–47.1)
HEMOGLOBIN: 8.7 G/DL (ref 11.9–15.1)
IMMATURE GRANULOCYTES: 0 %
KETONES, URINE: NEGATIVE
LEUKOCYTE ESTERASE, URINE: ABNORMAL
LYMPHOCYTES # BLD: 28 % (ref 24–43)
MCH RBC QN AUTO: 20 PG (ref 25.2–33.5)
MCHC RBC AUTO-ENTMCNC: 26.2 G/DL (ref 28.4–34.8)
MCV RBC AUTO: 76.3 FL (ref 82.6–102.9)
MONOCYTES # BLD: 6 % (ref 3–12)
MORPHOLOGY: ABNORMAL
MUCUS: NORMAL
NITRITE, URINE: NEGATIVE
NRBC AUTOMATED: 0 PER 100 WBC
OTHER OBSERVATIONS UA: NORMAL
PDW BLD-RTO: 21.8 % (ref 11.8–14.4)
PH UA: 6.5 (ref 5–8)
PLATELET # BLD: ABNORMAL K/UL (ref 138–453)
PLATELET ESTIMATE: ABNORMAL
PLATELET, FLUORESCENCE: 273 K/UL (ref 138–453)
PLATELET, IMMATURE FRACTION: 7.2 % (ref 1.1–10.3)
PMV BLD AUTO: ABNORMAL FL (ref 8.1–13.5)
POTASSIUM SERPL-SCNC: 3.7 MMOL/L (ref 3.7–5.3)
PROTEIN UA: NEGATIVE
RBC # BLD: 4.35 M/UL (ref 3.95–5.11)
RBC # BLD: ABNORMAL 10*6/UL
RBC UA: NORMAL /HPF (ref 0–4)
RENAL EPITHELIAL, UA: NORMAL /HPF
SEG NEUTROPHILS: 64 % (ref 36–65)
SEGMENTED NEUTROPHILS ABSOLUTE COUNT: 5.63 K/UL (ref 1.5–8.1)
SODIUM BLD-SCNC: 140 MMOL/L (ref 135–144)
SPECIFIC GRAVITY UA: 1.01 (ref 1–1.03)
TOTAL PROTEIN: 7.8 G/DL (ref 6.4–8.3)
TRICHOMONAS: NORMAL
TURBIDITY: CLEAR
URINE HGB: ABNORMAL
UROBILINOGEN, URINE: NORMAL
WBC # BLD: 8.8 K/UL (ref 3.5–11.3)
WBC # BLD: ABNORMAL 10*3/UL
WBC UA: NORMAL /HPF (ref 0–5)
YEAST: NORMAL

## 2019-06-11 PROCEDURE — 80053 COMPREHEN METABOLIC PANEL: CPT

## 2019-06-11 PROCEDURE — 86304 IMMUNOASSAY TUMOR CA 125: CPT

## 2019-06-11 PROCEDURE — 85025 COMPLETE CBC W/AUTO DIFF WBC: CPT

## 2019-06-11 PROCEDURE — 36415 COLL VENOUS BLD VENIPUNCTURE: CPT

## 2019-06-11 PROCEDURE — 93005 ELECTROCARDIOGRAM TRACING: CPT

## 2019-06-11 PROCEDURE — 71046 X-RAY EXAM CHEST 2 VIEWS: CPT

## 2019-06-11 PROCEDURE — 81025 URINE PREGNANCY TEST: CPT

## 2019-06-11 PROCEDURE — 81001 URINALYSIS AUTO W/SCOPE: CPT

## 2019-06-11 PROCEDURE — 85055 RETICULATED PLATELET ASSAY: CPT

## 2019-06-11 RX ORDER — SODIUM CHLORIDE, SODIUM LACTATE, POTASSIUM CHLORIDE, CALCIUM CHLORIDE 600; 310; 30; 20 MG/100ML; MG/100ML; MG/100ML; MG/100ML
1000 INJECTION, SOLUTION INTRAVENOUS CONTINUOUS
Status: CANCELLED | OUTPATIENT
Start: 2019-06-11

## 2019-06-11 SDOH — HEALTH STABILITY: MENTAL HEALTH: HOW OFTEN DO YOU HAVE A DRINK CONTAINING ALCOHOL?: MONTHLY OR LESS

## 2019-06-11 NOTE — PROGRESS NOTES
Anesthesia Focused Assessment    STOP-BANG Sleep Apnea Questionnaire    SNORE loudly (heard through closed doors)? Yes, sometimes  TIRED, fatigued, sleepy during daytime? No  OBSERVED stopping breathing during sleep? No  High blood PRESSURE or being treated? No    BMI over 35? No  AGE over 48? No  NECK circumference over 16\"? Yes  GENDER (male)? No             Total 2  High risk 5-8  Intermediate risk 3-4  Low risk 0-2    ----------------------------------------------------------------------------------------------------------------------  ALEXANDER:no  If yes, machine?:     Type 1 DM:   no  T2DM:  Pre-dm; no RX    Coronary Artery Disease:  no  Hypertension:  no  Defib / AICD / Pacemaker: no    Renal Failure:  no  If yes, on dialysis?:     Active smoker:  no  Drinks Alcohol:  occasional  Illicit drugs: no    Dentition: reports a couple molars are cracked, also has a permanent discolored \"crack\" on her left front tooth from childhood    Past Medical History:   Diagnosis Date    Abnormal uterine bleeding 05/05/2019    7 units of bloood, admiitted    Anemia     Endometrioid adenocarcinoma of uterus ([facility] [address] 05/23/2019    H/O transfusion May 5-8 2019    7 units     Obesity     Pre-diabetes     no RX currently, did not tolerate metformin    Snores     Tooth discoloration     left upper front tooth- has a hyperpigmented crack - from childhood    Wears contact lenses          Patient was evaluated in PAT & anesthesia guidelines were applied. NPO guidelines, medication instructions and scheduled arrival time were reviewed with patient.      Hx of anesthesia complications:  No prior GA  Family hx of anesthesia complications:  no                                                                                                                     Anesthesia contacted:   no    Medical or cardiac clearance ordered: medical clearance pending PAT results     DEEPA SINGH CNP  6/11/19  10:34 AM

## 2019-06-12 ENCOUNTER — TELEPHONE (OUTPATIENT)
Dept: ADMINISTRATIVE | Age: 39
End: 2019-06-12

## 2019-06-12 LAB
EKG ATRIAL RATE: 63 BPM
EKG P AXIS: 27 DEGREES
EKG P-R INTERVAL: 140 MS
EKG Q-T INTERVAL: 412 MS
EKG QRS DURATION: 90 MS
EKG QTC CALCULATION (BAZETT): 421 MS
EKG R AXIS: 24 DEGREES
EKG T AXIS: 4 DEGREES
EKG VENTRICULAR RATE: 63 BPM

## 2019-06-12 PROCEDURE — 93010 ELECTROCARDIOGRAM REPORT: CPT | Performed by: INTERNAL MEDICINE

## 2019-06-12 RX ORDER — SULFAMETHOXAZOLE AND TRIMETHOPRIM 800; 160 MG/1; MG/1
1 TABLET ORAL 2 TIMES DAILY
Qty: 6 TABLET | Refills: 0 | Status: SHIPPED | OUTPATIENT
Start: 2019-06-12 | End: 2019-06-15

## 2019-06-12 NOTE — TELEPHONE ENCOUNTER
Pt called was told to call office after she had EKG done at [facility] x yest so her results could get reviewed for Surgery Clearance for her Hyst on 6/25/2019 by Dr Eduardo Ruvalcaba.  please review and call pt at [phone]

## 2019-06-19 ENCOUNTER — TELEPHONE (OUTPATIENT)
Dept: GYNECOLOGIC ONCOLOGY | Age: 39
End: 2019-06-19

## 2019-06-19 NOTE — TELEPHONE ENCOUNTER
[address] inquiring about pts surgical clearance, pt was seen on 05/30 per note surgical clearance was pending results for EKG     Please review and advise     Surgery scheduled 06/25    Perfect serve sent to MD

## 2019-06-20 NOTE — TELEPHONE ENCOUNTER
Gyn Oncology checking on Surgery Clearance---Clearance has not been completed by Dr. Levon Case and Jacqueline Lanier had no available appts for patient to be seen before her 6/25 surgery. Gyn will contact patient and advise to use Walk In clinic for clearance.

## 2019-06-20 NOTE — TELEPHONE ENCOUNTER
Gyn Oncology checking on Surgery Clearance---Clearance has not been completed by Dr. Jl Marte and Yasemin Nunes had no available appts for patient to be seen before her 6/25 surgery.    Gyn will contact patient and advise to use Walk In clinic for clearance

## 2019-06-21 ENCOUNTER — OFFICE VISIT (OUTPATIENT)
Dept: INTERNAL MEDICINE | Age: 39
End: 2019-06-21

## 2019-06-21 VITALS
SYSTOLIC BLOOD PRESSURE: 135 MMHG | BODY MASS INDEX: 53.92 KG/M2 | WEIGHT: 293 LBS | DIASTOLIC BLOOD PRESSURE: 89 MMHG | HEART RATE: 94 BPM | HEIGHT: 62 IN

## 2019-06-21 DIAGNOSIS — E66.01 MORBID OBESITY WITH BMI OF 50.0-59.9, ADULT (HCC): ICD-10-CM

## 2019-06-21 PROBLEM — Z92.89 HISTORY OF ENDOMETRIAL BIOPSY: Status: RESOLVED | Noted: 2019-05-07 | Resolved: 2019-06-21

## 2019-06-21 PROBLEM — Z92.89 HISTORY OF BLOOD TRANSFUSION: Status: RESOLVED | Noted: 2019-05-13 | Resolved: 2019-06-21

## 2019-06-21 PROCEDURE — 99213 OFFICE O/P EST LOW 20 MIN: CPT | Performed by: INTERNAL MEDICINE

## 2019-06-21 PROCEDURE — 99211 OFF/OP EST MAY X REQ PHY/QHP: CPT | Performed by: INTERNAL MEDICINE

## 2019-06-21 ASSESSMENT — ENCOUNTER SYMPTOMS
SORE THROAT: 0
COUGH: 0
BLOOD IN STOOL: 0
COLOR CHANGE: 0
WHEEZING: 0
NAUSEA: 0
SHORTNESS OF BREATH: 0
PHOTOPHOBIA: 0
ABDOMINAL PAIN: 0
CONSTIPATION: 0
VOMITING: 0
EYE DISCHARGE: 0
EYE PAIN: 0
DIARRHEA: 0

## 2019-06-21 NOTE — PROGRESS NOTES
[facility]  [facility] [address] [address]  Dept: [phone]  Dept Fax: [phone]    Office Progress/Follow Up Note  Date ofpatient's visit: 6/21/2019  Patient's Name:  Mary Stevens YOB: 1980            Patient Care Team:  Corine Perez MD as PCP - General (Internal Medicine)  Vu Ferreira RN as Nurse Navigator (Oncology)  ================================================================    REASON FOR VISIT/CHIEF COMPLAINT:  Other (Surgical Clearance. Patient is having total hysterectomy due to cancer. Scheduled for 6/25/2019. Patient has had all clearance testing completed. )    HISTORY OF PRESENTING ILLNESS:  History was obtained from: patient. Sharan aly 41 y.o. is here for a preop evaluation for total hysterectomy due to cancer. Patient does not have any heart or lung disease. Patient denies any exertional chest pain or shortness of breath. EKG completed last week showed normal sinus rhythm without any arrhythmias. She has no new complaints today.       Patient Active Problem List   Diagnosis    Anemia    Morbid obesity with BMI of 50.0-59.9, adult ([address]    Heavy menses    Endometrioid adenocarcinoma of uterus ([facility] [address]       Health Maintenance Due   Topic Date Due    Pneumococcal 0-64 years Vaccine (1 of 1 - PPSV23) 10/11/1987    Diabetic retinal exam  10/11/1991    Lipid screen  10/11/1991    HIV screen  10/11/1996    Diabetic microalbuminuria test  10/11/1999    Hepatitis B Vaccine (1 of 3 - Risk 3-dose series) 10/11/2000    DTaP/Tdap/Td vaccine (1 - Tdap) 10/11/2000    Cervical cancer screen  10/11/2002       Allergies   Allergen Reactions    Metformin And Related Other (See Comments)     Abdominal cramps         Current Outpatient Medications   Medication Sig Dispense Refill    medroxyPROGESTERone (PROVERA) 10 MG tablet Take 1 tablet by mouth 3 times daily 90 tablet 0    ferrous sulfate 325 (65 Fe) MG EC tablet Take 1 tablet by mouth 3 times daily (with meals) 90 tablet 5     No current facility-administered medications for this visit. Social History     Tobacco Use    Smoking status: Never Smoker    Smokeless tobacco: Never Used   Substance Use Topics    Alcohol use: Not Currently     Frequency: Monthly or less     Comment: occasional    Drug use: No       Family History   Problem Relation Age of Onset    Cancer Maternal Grandmother         skin    No Known Problems Father     Heart Disease Maternal Grandfather     Diabetes Maternal Grandfather     Colon Cancer Maternal Grandfather     No Known Problems Paternal Grandmother     No Known Problems Paternal Grandfather         REVIEW OF SYSTEMS:  Review of Systems   Constitutional: Negative for fatigue and fever. HENT: Negative for congestion and sore throat. Eyes: Negative for photophobia, pain, discharge and visual disturbance. Respiratory: Negative for cough, shortness of breath and wheezing. Cardiovascular: Negative for chest pain, palpitations and leg swelling. Gastrointestinal: Negative for abdominal pain, blood in stool, constipation, diarrhea, nausea and vomiting. Genitourinary: Negative for dysuria, frequency and urgency. Musculoskeletal: Negative for arthralgias and myalgias. Skin: Negative for color change and rash. Neurological: Negative for dizziness, tremors, seizures, syncope, weakness, numbness and headaches. Psychiatric/Behavioral: Negative for agitation, behavioral problems, confusion, sleep disturbance and suicidal ideas. PHYSICAL EXAM:  Vitals:    06/21/19 1357   BP: (!) 145/89   Site: Left Upper Arm   Position: Sitting   Cuff Size: Large Adult   Pulse: 94   Weight: 294 lb 12.8 oz (133.7 kg)   Height: 5' 2\" (1.575 m)     BP Readings from Last 3 Encounters:   06/21/19 (!) 145/89   06/11/19 128/71   05/30/19 127/79        Physical Exam   Constitutional: She is oriented to person, place, and time.  No distress. HENT:   Head: Normocephalic and atraumatic. Right Ear: External ear normal.   Left Ear: External ear normal.   Nose: Nose normal.   Mouth/Throat: No oropharyngeal exudate. Eyes: Pupils are equal, round, and reactive to light. EOM are normal.   Neck: Normal range of motion. Neck supple. No thyromegaly present. Cardiovascular: Normal rate, regular rhythm, normal heart sounds and intact distal pulses. Pulmonary/Chest: Effort normal and breath sounds normal. No respiratory distress. She has no wheezes. Abdominal: Soft. Bowel sounds are normal. She exhibits no distension and no mass. There is no tenderness. Musculoskeletal: Normal range of motion. She exhibits no edema or tenderness. Neurological: She is alert and oriented to person, place, and time. No cranial nerve deficit. Skin: Skin is warm. No rash noted. She is not diaphoretic. No erythema. Psychiatric: Judgment normal.         DIAGNOSTIC FINDINGS:  CBC:  Lab Results   Component Value Date    WBC 8.8 06/11/2019    HGB 8.7 06/11/2019    PLT See Reflexed IPF Result 06/11/2019       BMP:    Lab Results   Component Value Date     06/11/2019    K 3.7 06/11/2019     06/11/2019    CO2 21 06/11/2019    BUN 10 06/11/2019    CREATININE 0.73 06/11/2019    GLUCOSE 103 06/11/2019       HEMOGLOBIN A1C:   Lab Results   Component Value Date    LABA1C 7.0 05/05/2019       ASSESSMENT AND PLAN:  Theo Flores was seen today for other. Diagnoses and all orders for this visit:    Preop evaluation for hysterectomy   Morbid obesity with BMI of 50.0-59.9, adult [facility])    PLAN  Patient has low risk for post surgery. No further work-up is needed. FOLLOW UP AND INSTRUCTIONS:  · No follow-ups on file. · Theo Flores received counseling on the following healthy behaviors: nutrition and exercise    · Discussed use, benefit, and side effects of prescribed medications. Barriers to medication compliance addressed. All patient questions answered.   Pt

## 2019-06-21 NOTE — PATIENT INSTRUCTIONS
Surgical clearance completed, scanned into media, faxed to OBGYN at [phone] and copy given to pt. Return To Clinic 9/3/2019. After Visit Summary  given and reviewed. --MA    It is very important for your care that you keep your appointment. If for some reason you are unable to keep your appointment it is equally important that you call our office at [phone] to cancel your appointment and reschedule. Failure to do so may result in your termination from our practice.

## 2019-06-24 ENCOUNTER — ANESTHESIA EVENT (OUTPATIENT)
Dept: OPERATING ROOM | Age: 39
DRG: 734 | End: 2019-06-24

## 2019-06-25 ENCOUNTER — ANESTHESIA (OUTPATIENT)
Dept: OPERATING ROOM | Age: 39
DRG: 734 | End: 2019-06-25

## 2019-06-25 ENCOUNTER — HOSPITAL ENCOUNTER (INPATIENT)
Age: 39
LOS: 1 days | Discharge: HOME | DRG: 734 | End: 2019-06-26
Attending: OBSTETRICS & GYNECOLOGY | Admitting: OBSTETRICS & GYNECOLOGY

## 2019-06-25 VITALS — DIASTOLIC BLOOD PRESSURE: 83 MMHG | OXYGEN SATURATION: 100 % | TEMPERATURE: 98.4 F | SYSTOLIC BLOOD PRESSURE: 98 MMHG

## 2019-06-25 PROBLEM — Z90.710 S/P LAPAROSCOPIC HYSTERECTOMY: Status: ACTIVE | Noted: 2019-06-25

## 2019-06-25 PROBLEM — Z90.710 S/P HYSTERECTOMY: Status: ACTIVE | Noted: 2019-06-25

## 2019-06-25 LAB
ABSOLUTE EOS #: 0 K/UL (ref 0–0.4)
ABSOLUTE IMMATURE GRANULOCYTE: 0 K/UL (ref 0–0.3)
ABSOLUTE LYMPH #: 0.23 K/UL (ref 1–4.8)
ABSOLUTE MONO #: 0.11 K/UL (ref 0.1–0.8)
ANION GAP SERPL CALCULATED.3IONS-SCNC: 8 MMOL/L (ref 9–17)
BASOPHILS # BLD: 0 % (ref 0–2)
BASOPHILS ABSOLUTE: 0 K/UL (ref 0–0.2)
BUN BLDV-MCNC: 7 MG/DL (ref 6–20)
BUN/CREAT BLD: ABNORMAL (ref 9–20)
CALCIUM IONIZED: 1.11 MMOL/L (ref 1.13–1.33)
CALCIUM SERPL-MCNC: 5.9 MG/DL (ref 8.6–10.4)
CASE NUMBER:: NORMAL
CHLORIDE BLD-SCNC: 116 MMOL/L (ref 98–107)
CO2: 17 MMOL/L (ref 20–31)
CREAT SERPL-MCNC: 0.6 MG/DL (ref 0.5–0.9)
DIFFERENTIAL TYPE: ABNORMAL
EOSINOPHILS RELATIVE PERCENT: 0 % (ref 1–4)
GFR AFRICAN AMERICAN: >60 ML/MIN
GFR NON-AFRICAN AMERICAN: >60 ML/MIN
GFR SERPL CREATININE-BSD FRML MDRD: ABNORMAL ML/MIN/{1.73_M2}
GFR SERPL CREATININE-BSD FRML MDRD: ABNORMAL ML/MIN/{1.73_M2}
GLUCOSE BLD-MCNC: 153 MG/DL (ref 65–105)
GLUCOSE BLD-MCNC: 170 MG/DL (ref 70–99)
GLUCOSE BLD-MCNC: 190 MG/DL (ref 65–105)
GLUCOSE BLD-MCNC: 202 MG/DL (ref 65–105)
HCG, PREGNANCY URINE (POC): NEGATIVE
HCT VFR BLD CALC: 24.5 % (ref 36.3–47.1)
HEMOGLOBIN: 6.2 G/DL (ref 11.9–15.1)
IMMATURE GRANULOCYTES: 0 %
LYMPHOCYTES # BLD: 2 % (ref 24–44)
MCH RBC QN AUTO: 18.8 PG (ref 25.2–33.5)
MCHC RBC AUTO-ENTMCNC: 25.3 G/DL (ref 28.4–34.8)
MCV RBC AUTO: 74.5 FL (ref 82.6–102.9)
MONOCYTES # BLD: 1 % (ref 1–7)
MORPHOLOGY: ABNORMAL
NRBC AUTOMATED: 0 PER 100 WBC
PDW BLD-RTO: 21.2 % (ref 11.8–14.4)
PLATELET # BLD: ABNORMAL K/UL (ref 138–453)
PLATELET ESTIMATE: ABNORMAL
PLATELET, FLUORESCENCE: 211 K/UL (ref 138–453)
PLATELET, IMMATURE FRACTION: 4.4 % (ref 1.1–10.3)
PMV BLD AUTO: ABNORMAL FL (ref 8.1–13.5)
POC HEMATOCRIT: 30 % (ref 36–46)
POC HEMOGLOBIN: 10.2 G/DL (ref 12–16)
POTASSIUM SERPL-SCNC: 2.9 MMOL/L (ref 3.7–5.3)
RBC # BLD: 3.29 M/UL (ref 3.95–5.11)
RBC # BLD: ABNORMAL 10*6/UL
SEG NEUTROPHILS: 97 % (ref 36–66)
SEGMENTED NEUTROPHILS ABSOLUTE COUNT: 10.96 K/UL (ref 1.8–7.7)
SODIUM BLD-SCNC: 141 MMOL/L (ref 135–144)
SPECIMEN DESCRIPTION: NORMAL
WBC # BLD: 11.3 K/UL (ref 3.5–11.3)
WBC # BLD: ABNORMAL 10*3/UL

## 2019-06-25 PROCEDURE — 86920 COMPATIBILITY TEST SPIN: CPT

## 2019-06-25 PROCEDURE — 2580000003 HC RX 258: Performed by: ANESTHESIOLOGY

## 2019-06-25 PROCEDURE — 2580000003 HC RX 258

## 2019-06-25 PROCEDURE — 86900 BLOOD TYPING SEROLOGIC ABO: CPT

## 2019-06-25 PROCEDURE — 82330 ASSAY OF CALCIUM: CPT

## 2019-06-25 PROCEDURE — 36415 COLL VENOUS BLD VENIPUNCTURE: CPT

## 2019-06-25 PROCEDURE — 3600000009 HC SURGERY ROBOT BASE: Performed by: OBSTETRICS & GYNECOLOGY

## 2019-06-25 PROCEDURE — 2580000003 HC RX 258: Performed by: OBSTETRICS & GYNECOLOGY

## 2019-06-25 PROCEDURE — 6360000002 HC RX W HCPCS: Performed by: ANESTHESIOLOGY

## 2019-06-25 PROCEDURE — C1760 CLOSURE DEV, VASC: HCPCS | Performed by: OBSTETRICS & GYNECOLOGY

## 2019-06-25 PROCEDURE — 82947 ASSAY GLUCOSE BLOOD QUANT: CPT

## 2019-06-25 PROCEDURE — 2720000010 HC SURG SUPPLY STERILE: Performed by: OBSTETRICS & GYNECOLOGY

## 2019-06-25 PROCEDURE — 6360000002 HC RX W HCPCS: Performed by: OBSTETRICS & GYNECOLOGY

## 2019-06-25 PROCEDURE — 3700000001 HC ADD 15 MINUTES (ANESTHESIA): Performed by: OBSTETRICS & GYNECOLOGY

## 2019-06-25 PROCEDURE — 88307 TISSUE EXAM BY PATHOLOGIST: CPT

## 2019-06-25 PROCEDURE — 2580000003 HC RX 258: Performed by: NURSE ANESTHETIST, CERTIFIED REGISTERED

## 2019-06-25 PROCEDURE — 7100000001 HC PACU RECOVERY - ADDTL 15 MIN: Performed by: OBSTETRICS & GYNECOLOGY

## 2019-06-25 PROCEDURE — 6360000002 HC RX W HCPCS

## 2019-06-25 PROCEDURE — 85014 HEMATOCRIT: CPT

## 2019-06-25 PROCEDURE — 0UT2FZZ RESECTION OF BILATERAL OVARIES, VIA NATURAL OR ARTIFICIAL OPENING WITH PERCUTANEOUS ENDOSCOPIC ASSISTANCE: ICD-10-PCS | Performed by: OBSTETRICS & GYNECOLOGY

## 2019-06-25 PROCEDURE — 88342 IMHCHEM/IMCYTCHM 1ST ANTB: CPT

## 2019-06-25 PROCEDURE — 88341 IMHCHEM/IMCYTCHM EA ADD ANTB: CPT

## 2019-06-25 PROCEDURE — 85025 COMPLETE CBC W/AUTO DIFF WBC: CPT

## 2019-06-25 PROCEDURE — 88112 CYTOPATH CELL ENHANCE TECH: CPT

## 2019-06-25 PROCEDURE — 07TC4ZZ RESECTION OF PELVIS LYMPHATIC, PERCUTANEOUS ENDOSCOPIC APPROACH: ICD-10-PCS | Performed by: OBSTETRICS & GYNECOLOGY

## 2019-06-25 PROCEDURE — 0UT9FZZ RESECTION OF UTERUS, VIA NATURAL OR ARTIFICIAL OPENING WITH PERCUTANEOUS ENDOSCOPIC ASSISTANCE: ICD-10-PCS | Performed by: OBSTETRICS & GYNECOLOGY

## 2019-06-25 PROCEDURE — 88304 TISSUE EXAM BY PATHOLOGIST: CPT

## 2019-06-25 PROCEDURE — 6360000002 HC RX W HCPCS: Performed by: STUDENT IN AN ORGANIZED HEALTH CARE EDUCATION/TRAINING PROGRAM

## 2019-06-25 PROCEDURE — 3700000000 HC ANESTHESIA ATTENDED CARE: Performed by: OBSTETRICS & GYNECOLOGY

## 2019-06-25 PROCEDURE — 2500000003 HC RX 250 WO HCPCS: Performed by: STUDENT IN AN ORGANIZED HEALTH CARE EDUCATION/TRAINING PROGRAM

## 2019-06-25 PROCEDURE — 86901 BLOOD TYPING SEROLOGIC RH(D): CPT

## 2019-06-25 PROCEDURE — C9290 INJ, BUPIVACAINE LIPOSOME: HCPCS | Performed by: OBSTETRICS & GYNECOLOGY

## 2019-06-25 PROCEDURE — 87086 URINE CULTURE/COLONY COUNT: CPT

## 2019-06-25 PROCEDURE — 88309 TISSUE EXAM BY PATHOLOGIST: CPT

## 2019-06-25 PROCEDURE — 2709999900 HC NON-CHARGEABLE SUPPLY: Performed by: OBSTETRICS & GYNECOLOGY

## 2019-06-25 PROCEDURE — 86850 RBC ANTIBODY SCREEN: CPT

## 2019-06-25 PROCEDURE — S2900 ROBOTIC SURGICAL SYSTEM: HCPCS | Performed by: OBSTETRICS & GYNECOLOGY

## 2019-06-25 PROCEDURE — 88305 TISSUE EXAM BY PATHOLOGIST: CPT

## 2019-06-25 PROCEDURE — 2500000003 HC RX 250 WO HCPCS: Performed by: OBSTETRICS & GYNECOLOGY

## 2019-06-25 PROCEDURE — 2580000003 HC RX 258: Performed by: STUDENT IN AN ORGANIZED HEALTH CARE EDUCATION/TRAINING PROGRAM

## 2019-06-25 PROCEDURE — 6370000000 HC RX 637 (ALT 250 FOR IP): Performed by: STUDENT IN AN ORGANIZED HEALTH CARE EDUCATION/TRAINING PROGRAM

## 2019-06-25 PROCEDURE — 81025 URINE PREGNANCY TEST: CPT

## 2019-06-25 PROCEDURE — P9016 RBC LEUKOCYTES REDUCED: HCPCS

## 2019-06-25 PROCEDURE — 3600000019 HC SURGERY ROBOT ADDTL 15MIN: Performed by: OBSTETRICS & GYNECOLOGY

## 2019-06-25 PROCEDURE — 8E0W4CZ ROBOTIC ASSISTED PROCEDURE OF TRUNK REGION, PERCUTANEOUS ENDOSCOPIC APPROACH: ICD-10-PCS | Performed by: OBSTETRICS & GYNECOLOGY

## 2019-06-25 PROCEDURE — 0UT7FZZ RESECTION OF BILATERAL FALLOPIAN TUBES, VIA NATURAL OR ARTIFICIAL OPENING WITH PERCUTANEOUS ENDOSCOPIC ASSISTANCE: ICD-10-PCS | Performed by: OBSTETRICS & GYNECOLOGY

## 2019-06-25 PROCEDURE — 85055 RETICULATED PLATELET ASSAY: CPT

## 2019-06-25 PROCEDURE — 2500000003 HC RX 250 WO HCPCS: Performed by: NURSE ANESTHETIST, CERTIFIED REGISTERED

## 2019-06-25 PROCEDURE — 7100000000 HC PACU RECOVERY - FIRST 15 MIN: Performed by: OBSTETRICS & GYNECOLOGY

## 2019-06-25 PROCEDURE — 6360000002 HC RX W HCPCS: Performed by: NURSE ANESTHETIST, CERTIFIED REGISTERED

## 2019-06-25 PROCEDURE — 80048 BASIC METABOLIC PNL TOTAL CA: CPT

## 2019-06-25 PROCEDURE — 1200000000 HC SEMI PRIVATE

## 2019-06-25 PROCEDURE — 6370000000 HC RX 637 (ALT 250 FOR IP): Performed by: OBSTETRICS & GYNECOLOGY

## 2019-06-25 RX ORDER — ACETAMINOPHEN 325 MG/1
650 TABLET ORAL EVERY 4 HOURS PRN
Status: DISCONTINUED | OUTPATIENT
Start: 2019-06-26 | End: 2019-06-26 | Stop reason: HOSPADM

## 2019-06-25 RX ORDER — FENTANYL CITRATE 50 UG/ML
INJECTION, SOLUTION INTRAMUSCULAR; INTRAVENOUS PRN
Status: DISCONTINUED | OUTPATIENT
Start: 2019-06-25 | End: 2019-06-25 | Stop reason: SDUPTHER

## 2019-06-25 RX ORDER — DEXAMETHASONE 4 MG/1
4 TABLET ORAL ONCE
Status: COMPLETED | OUTPATIENT
Start: 2019-06-25 | End: 2019-06-25

## 2019-06-25 RX ORDER — 0.9 % SODIUM CHLORIDE 0.9 %
500 INTRAVENOUS SOLUTION INTRAVENOUS ONCE
Status: COMPLETED | OUTPATIENT
Start: 2019-06-25 | End: 2019-06-25

## 2019-06-25 RX ORDER — POTASSIUM CHLORIDE 7.45 MG/ML
10 INJECTION INTRAVENOUS PRN
Status: DISCONTINUED | OUTPATIENT
Start: 2019-06-25 | End: 2019-06-26 | Stop reason: HOSPADM

## 2019-06-25 RX ORDER — LORAZEPAM 2 MG/ML
0.5 INJECTION INTRAMUSCULAR ONCE
Status: DISCONTINUED | OUTPATIENT
Start: 2019-06-25 | End: 2019-06-26 | Stop reason: HOSPADM

## 2019-06-25 RX ORDER — GABAPENTIN 600 MG/1
300 TABLET ORAL ONCE
Status: COMPLETED | OUTPATIENT
Start: 2019-06-25 | End: 2019-06-25

## 2019-06-25 RX ORDER — SODIUM CHLORIDE 0.9 % (FLUSH) 0.9 %
10 SYRINGE (ML) INJECTION PRN
Status: DISCONTINUED | OUTPATIENT
Start: 2019-06-25 | End: 2019-06-25 | Stop reason: HOSPADM

## 2019-06-25 RX ORDER — LIDOCAINE HYDROCHLORIDE 10 MG/ML
1 INJECTION, SOLUTION EPIDURAL; INFILTRATION; INTRACAUDAL; PERINEURAL
Status: DISCONTINUED | OUTPATIENT
Start: 2019-06-25 | End: 2019-06-25 | Stop reason: HOSPADM

## 2019-06-25 RX ORDER — HYDROCODONE BITARTRATE AND ACETAMINOPHEN 5; 325 MG/1; MG/1
1 TABLET ORAL EVERY 4 HOURS PRN
Status: DISCONTINUED | OUTPATIENT
Start: 2019-06-25 | End: 2019-06-26 | Stop reason: HOSPADM

## 2019-06-25 RX ORDER — LIDOCAINE HYDROCHLORIDE 10 MG/ML
INJECTION, SOLUTION EPIDURAL; INFILTRATION; INTRACAUDAL; PERINEURAL PRN
Status: DISCONTINUED | OUTPATIENT
Start: 2019-06-25 | End: 2019-06-25 | Stop reason: SDUPTHER

## 2019-06-25 RX ORDER — HYDROCODONE BITARTRATE AND ACETAMINOPHEN 5; 325 MG/1; MG/1
2 TABLET ORAL EVERY 4 HOURS PRN
Status: DISCONTINUED | OUTPATIENT
Start: 2019-06-25 | End: 2019-06-26 | Stop reason: HOSPADM

## 2019-06-25 RX ORDER — PROMETHAZINE HYDROCHLORIDE 25 MG/ML
12.5 INJECTION, SOLUTION INTRAMUSCULAR; INTRAVENOUS EVERY 6 HOURS PRN
Status: DISCONTINUED | OUTPATIENT
Start: 2019-06-25 | End: 2019-06-26 | Stop reason: HOSPADM

## 2019-06-25 RX ORDER — FENTANYL CITRATE 50 UG/ML
50 INJECTION, SOLUTION INTRAMUSCULAR; INTRAVENOUS EVERY 5 MIN PRN
Status: COMPLETED | OUTPATIENT
Start: 2019-06-25 | End: 2019-06-25

## 2019-06-25 RX ORDER — NEOSTIGMINE METHYLSULFATE 5 MG/5 ML
SYRINGE (ML) INTRAVENOUS PRN
Status: DISCONTINUED | OUTPATIENT
Start: 2019-06-25 | End: 2019-06-25 | Stop reason: SDUPTHER

## 2019-06-25 RX ORDER — SODIUM CHLORIDE 0.9 % (FLUSH) 0.9 %
10 SYRINGE (ML) INJECTION PRN
Status: DISCONTINUED | OUTPATIENT
Start: 2019-06-25 | End: 2019-06-26 | Stop reason: HOSPADM

## 2019-06-25 RX ORDER — MORPHINE SULFATE 10 MG/ML
INJECTION, SOLUTION INTRAMUSCULAR; INTRAVENOUS PRN
Status: DISCONTINUED | OUTPATIENT
Start: 2019-06-25 | End: 2019-06-25 | Stop reason: SDUPTHER

## 2019-06-25 RX ORDER — 0.9 % SODIUM CHLORIDE 0.9 %
250 INTRAVENOUS SOLUTION INTRAVENOUS ONCE
Status: COMPLETED | OUTPATIENT
Start: 2019-06-25 | End: 2019-06-26

## 2019-06-25 RX ORDER — PROPOFOL 10 MG/ML
INJECTION, EMULSION INTRAVENOUS PRN
Status: DISCONTINUED | OUTPATIENT
Start: 2019-06-25 | End: 2019-06-25 | Stop reason: SDUPTHER

## 2019-06-25 RX ORDER — SIMETHICONE 80 MG
80 TABLET,CHEWABLE ORAL EVERY 6 HOURS PRN
Status: DISCONTINUED | OUTPATIENT
Start: 2019-06-25 | End: 2019-06-26 | Stop reason: HOSPADM

## 2019-06-25 RX ORDER — MIDAZOLAM HYDROCHLORIDE 1 MG/ML
1 INJECTION INTRAMUSCULAR; INTRAVENOUS EVERY 5 MIN PRN
Status: COMPLETED | OUTPATIENT
Start: 2019-06-25 | End: 2019-06-25

## 2019-06-25 RX ORDER — ONDANSETRON 2 MG/ML
INJECTION INTRAMUSCULAR; INTRAVENOUS PRN
Status: DISCONTINUED | OUTPATIENT
Start: 2019-06-25 | End: 2019-06-25 | Stop reason: SDUPTHER

## 2019-06-25 RX ORDER — MIDAZOLAM HYDROCHLORIDE 1 MG/ML
INJECTION INTRAMUSCULAR; INTRAVENOUS PRN
Status: DISCONTINUED | OUTPATIENT
Start: 2019-06-25 | End: 2019-06-25 | Stop reason: SDUPTHER

## 2019-06-25 RX ORDER — HYDROCODONE BITARTRATE AND ACETAMINOPHEN 5; 325 MG/1; MG/1
2 TABLET ORAL EVERY 4 HOURS PRN
Status: DISCONTINUED | OUTPATIENT
Start: 2019-06-26 | End: 2019-06-25

## 2019-06-25 RX ORDER — SODIUM CHLORIDE, SODIUM LACTATE, POTASSIUM CHLORIDE, CALCIUM CHLORIDE 600; 310; 30; 20 MG/100ML; MG/100ML; MG/100ML; MG/100ML
INJECTION, SOLUTION INTRAVENOUS CONTINUOUS
Status: DISCONTINUED | OUTPATIENT
Start: 2019-06-25 | End: 2019-06-25

## 2019-06-25 RX ORDER — INDOCYANINE GREEN AND WATER 25 MG
KIT INJECTION PRN
Status: DISCONTINUED | OUTPATIENT
Start: 2019-06-25 | End: 2019-06-25 | Stop reason: ALTCHOICE

## 2019-06-25 RX ORDER — HYDROCODONE BITARTRATE AND ACETAMINOPHEN 5; 325 MG/1; MG/1
1 TABLET ORAL EVERY 4 HOURS PRN
Status: DISCONTINUED | OUTPATIENT
Start: 2019-06-26 | End: 2019-06-25

## 2019-06-25 RX ORDER — KETOROLAC TROMETHAMINE 30 MG/ML
15 INJECTION, SOLUTION INTRAMUSCULAR; INTRAVENOUS EVERY 6 HOURS
Status: DISCONTINUED | OUTPATIENT
Start: 2019-06-25 | End: 2019-06-25

## 2019-06-25 RX ORDER — ONDANSETRON 2 MG/ML
4 INJECTION INTRAMUSCULAR; INTRAVENOUS ONCE
Status: COMPLETED | OUTPATIENT
Start: 2019-06-25 | End: 2019-06-25

## 2019-06-25 RX ORDER — SODIUM CHLORIDE 0.9 % (FLUSH) 0.9 %
10 SYRINGE (ML) INJECTION EVERY 12 HOURS SCHEDULED
Status: DISCONTINUED | OUTPATIENT
Start: 2019-06-25 | End: 2019-06-25 | Stop reason: HOSPADM

## 2019-06-25 RX ORDER — POTASSIUM CHLORIDE 20 MEQ/1
40 TABLET, EXTENDED RELEASE ORAL PRN
Status: DISCONTINUED | OUTPATIENT
Start: 2019-06-25 | End: 2019-06-26 | Stop reason: HOSPADM

## 2019-06-25 RX ORDER — SODIUM CHLORIDE 9 MG/ML
125 INJECTION, SOLUTION INTRAVENOUS CONTINUOUS
Status: DISCONTINUED | OUTPATIENT
Start: 2019-06-25 | End: 2019-06-26 | Stop reason: HOSPADM

## 2019-06-25 RX ORDER — ROCURONIUM BROMIDE 10 MG/ML
INJECTION, SOLUTION INTRAVENOUS PRN
Status: DISCONTINUED | OUTPATIENT
Start: 2019-06-25 | End: 2019-06-25 | Stop reason: SDUPTHER

## 2019-06-25 RX ORDER — MIDAZOLAM HYDROCHLORIDE 1 MG/ML
INJECTION INTRAMUSCULAR; INTRAVENOUS
Status: COMPLETED
Start: 2019-06-25 | End: 2019-06-25

## 2019-06-25 RX ORDER — DOCUSATE SODIUM 100 MG/1
100 CAPSULE, LIQUID FILLED ORAL DAILY
Status: DISCONTINUED | OUTPATIENT
Start: 2019-06-25 | End: 2019-06-26 | Stop reason: HOSPADM

## 2019-06-25 RX ORDER — DIPHENHYDRAMINE HYDROCHLORIDE 50 MG/ML
12.5 INJECTION INTRAMUSCULAR; INTRAVENOUS
Status: DISCONTINUED | OUTPATIENT
Start: 2019-06-25 | End: 2019-06-25 | Stop reason: HOSPADM

## 2019-06-25 RX ORDER — FENTANYL CITRATE 50 UG/ML
25 INJECTION, SOLUTION INTRAMUSCULAR; INTRAVENOUS EVERY 5 MIN PRN
Status: DISCONTINUED | OUTPATIENT
Start: 2019-06-25 | End: 2019-06-25 | Stop reason: HOSPADM

## 2019-06-25 RX ORDER — SODIUM CHLORIDE, SODIUM LACTATE, POTASSIUM CHLORIDE, CALCIUM CHLORIDE 600; 310; 30; 20 MG/100ML; MG/100ML; MG/100ML; MG/100ML
INJECTION, SOLUTION INTRAVENOUS CONTINUOUS PRN
Status: DISCONTINUED | OUTPATIENT
Start: 2019-06-25 | End: 2019-06-25 | Stop reason: SDUPTHER

## 2019-06-25 RX ORDER — DEXTROSE, SODIUM CHLORIDE, AND POTASSIUM CHLORIDE 5; .45; .15 G/100ML; G/100ML; G/100ML
INJECTION INTRAVENOUS CONTINUOUS
Status: DISCONTINUED | OUTPATIENT
Start: 2019-06-25 | End: 2019-06-25

## 2019-06-25 RX ORDER — MAGNESIUM HYDROXIDE 1200 MG/15ML
LIQUID ORAL CONTINUOUS PRN
Status: COMPLETED | OUTPATIENT
Start: 2019-06-25 | End: 2019-06-25

## 2019-06-25 RX ORDER — DEXTROSE MONOHYDRATE 50 MG/ML
100 INJECTION, SOLUTION INTRAVENOUS PRN
Status: DISCONTINUED | OUTPATIENT
Start: 2019-06-25 | End: 2019-06-26 | Stop reason: HOSPADM

## 2019-06-25 RX ORDER — ONDANSETRON 4 MG/1
4 TABLET, ORALLY DISINTEGRATING ORAL ONCE
Status: DISCONTINUED | OUTPATIENT
Start: 2019-06-25 | End: 2019-06-26 | Stop reason: HOSPADM

## 2019-06-25 RX ORDER — IBUPROFEN 400 MG/1
600 TABLET ORAL EVERY 6 HOURS PRN
Status: DISCONTINUED | OUTPATIENT
Start: 2019-06-26 | End: 2019-06-25

## 2019-06-25 RX ORDER — ONDANSETRON 2 MG/ML
4 INJECTION INTRAMUSCULAR; INTRAVENOUS EVERY 8 HOURS PRN
Status: DISCONTINUED | OUTPATIENT
Start: 2019-06-25 | End: 2019-06-26 | Stop reason: HOSPADM

## 2019-06-25 RX ORDER — NICOTINE POLACRILEX 4 MG
15 LOZENGE BUCCAL PRN
Status: DISCONTINUED | OUTPATIENT
Start: 2019-06-25 | End: 2019-06-26 | Stop reason: HOSPADM

## 2019-06-25 RX ORDER — CEFAZOLIN SODIUM 1 G/3ML
INJECTION, POWDER, FOR SOLUTION INTRAMUSCULAR; INTRAVENOUS PRN
Status: DISCONTINUED | OUTPATIENT
Start: 2019-06-25 | End: 2019-06-25 | Stop reason: SDUPTHER

## 2019-06-25 RX ORDER — ONDANSETRON 2 MG/ML
4 INJECTION INTRAMUSCULAR; INTRAVENOUS
Status: DISCONTINUED | OUTPATIENT
Start: 2019-06-25 | End: 2019-06-25 | Stop reason: HOSPADM

## 2019-06-25 RX ORDER — SODIUM CHLORIDE, SODIUM LACTATE, POTASSIUM CHLORIDE, CALCIUM CHLORIDE 600; 310; 30; 20 MG/100ML; MG/100ML; MG/100ML; MG/100ML
1000 INJECTION, SOLUTION INTRAVENOUS CONTINUOUS
Status: DISCONTINUED | OUTPATIENT
Start: 2019-06-25 | End: 2019-06-25

## 2019-06-25 RX ORDER — MAGNESIUM HYDROXIDE 1200 MG/15ML
LIQUID ORAL
Status: COMPLETED
Start: 2019-06-25 | End: 2019-06-25

## 2019-06-25 RX ORDER — ACETAMINOPHEN 500 MG
1000 TABLET ORAL ONCE
Status: COMPLETED | OUTPATIENT
Start: 2019-06-25 | End: 2019-06-25

## 2019-06-25 RX ORDER — SODIUM CHLORIDE 9 MG/ML
INJECTION, SOLUTION INTRAVENOUS CONTINUOUS
Status: DISCONTINUED | OUTPATIENT
Start: 2019-06-25 | End: 2019-06-25

## 2019-06-25 RX ORDER — DEXTROSE MONOHYDRATE 25 G/50ML
12.5 INJECTION, SOLUTION INTRAVENOUS PRN
Status: DISCONTINUED | OUTPATIENT
Start: 2019-06-25 | End: 2019-06-26 | Stop reason: HOSPADM

## 2019-06-25 RX ORDER — CELECOXIB 100 MG/1
200 CAPSULE ORAL ONCE
Status: COMPLETED | OUTPATIENT
Start: 2019-06-25 | End: 2019-06-25

## 2019-06-25 RX ORDER — GLYCOPYRROLATE 1 MG/5 ML
SYRINGE (ML) INTRAVENOUS PRN
Status: DISCONTINUED | OUTPATIENT
Start: 2019-06-25 | End: 2019-06-25 | Stop reason: SDUPTHER

## 2019-06-25 RX ADMIN — CELECOXIB 200 MG: 100 CAPSULE ORAL at 06:33

## 2019-06-25 RX ADMIN — MORPHINE SULFATE 5 MG: 10 INJECTION, SOLUTION INTRAMUSCULAR; INTRAVENOUS at 11:36

## 2019-06-25 RX ADMIN — MIDAZOLAM HYDROCHLORIDE 1 MG: 1 INJECTION, SOLUTION INTRAMUSCULAR; INTRAVENOUS at 07:27

## 2019-06-25 RX ADMIN — ENOXAPARIN SODIUM 40 MG: 40 INJECTION SUBCUTANEOUS at 06:43

## 2019-06-25 RX ADMIN — MIDAZOLAM HYDROCHLORIDE 2 MG: 1 INJECTION, SOLUTION INTRAMUSCULAR; INTRAVENOUS at 07:30

## 2019-06-25 RX ADMIN — SODIUM CHLORIDE 1000 ML: 900 IRRIGANT IRRIGATION at 19:14

## 2019-06-25 RX ADMIN — POTASSIUM CHLORIDE 10 MEQ: 7.46 INJECTION, SOLUTION INTRAVENOUS at 23:07

## 2019-06-25 RX ADMIN — Medication 5 MG: at 12:45

## 2019-06-25 RX ADMIN — ROCURONIUM BROMIDE 25 MG: 10 INJECTION INTRAVENOUS at 11:01

## 2019-06-25 RX ADMIN — FENTANYL CITRATE 50 MCG: 50 INJECTION, SOLUTION INTRAMUSCULAR; INTRAVENOUS at 08:59

## 2019-06-25 RX ADMIN — FENTANYL CITRATE 50 MCG: 50 INJECTION INTRAMUSCULAR; INTRAVENOUS at 14:17

## 2019-06-25 RX ADMIN — GABAPENTIN 300 MG: 600 TABLET ORAL at 06:34

## 2019-06-25 RX ADMIN — Medication 0.2 MG: at 08:19

## 2019-06-25 RX ADMIN — HYDROMORPHONE HYDROCHLORIDE 0.25 MG: 1 INJECTION, SOLUTION INTRAMUSCULAR; INTRAVENOUS; SUBCUTANEOUS at 14:35

## 2019-06-25 RX ADMIN — Medication 3 G: at 07:50

## 2019-06-25 RX ADMIN — ROCURONIUM BROMIDE 50 MG: 10 INJECTION INTRAVENOUS at 09:07

## 2019-06-25 RX ADMIN — FENTANYL CITRATE 50 MCG: 50 INJECTION, SOLUTION INTRAMUSCULAR; INTRAVENOUS at 10:35

## 2019-06-25 RX ADMIN — DOCUSATE SODIUM 100 MG: 100 CAPSULE, LIQUID FILLED ORAL at 16:31

## 2019-06-25 RX ADMIN — POTASSIUM CHLORIDE, DEXTROSE MONOHYDRATE AND SODIUM CHLORIDE: 150; 5; 450 INJECTION, SOLUTION INTRAVENOUS at 16:49

## 2019-06-25 RX ADMIN — SIMETHICONE 80 MG: 80 TABLET, CHEWABLE ORAL at 16:31

## 2019-06-25 RX ADMIN — PROPOFOL 260 MG: 10 INJECTION, EMULSION INTRAVENOUS at 07:37

## 2019-06-25 RX ADMIN — MORPHINE SULFATE 5 MG: 10 INJECTION, SOLUTION INTRAMUSCULAR; INTRAVENOUS at 08:11

## 2019-06-25 RX ADMIN — LIDOCAINE HYDROCHLORIDE 50 MG: 10 INJECTION, SOLUTION EPIDURAL; INFILTRATION; INTRACAUDAL; PERINEURAL at 07:37

## 2019-06-25 RX ADMIN — BENZOCAINE, MENTHOL 1 LOZENGE: 15; 3.6 LOZENGE ORAL at 23:26

## 2019-06-25 RX ADMIN — SODIUM CHLORIDE 125 ML/HR: 9 INJECTION, SOLUTION INTRAVENOUS at 22:06

## 2019-06-25 RX ADMIN — FENTANYL CITRATE 50 MCG: 50 INJECTION INTRAMUSCULAR; INTRAVENOUS at 13:58

## 2019-06-25 RX ADMIN — MIDAZOLAM HYDROCHLORIDE 1 MG: 1 INJECTION, SOLUTION INTRAMUSCULAR; INTRAVENOUS at 07:32

## 2019-06-25 RX ADMIN — ACETAMINOPHEN 1000 MG: 500 TABLET ORAL at 06:33

## 2019-06-25 RX ADMIN — ROCURONIUM BROMIDE 50 MG: 10 INJECTION INTRAVENOUS at 08:02

## 2019-06-25 RX ADMIN — SODIUM CHLORIDE, POTASSIUM CHLORIDE, SODIUM LACTATE AND CALCIUM CHLORIDE: 600; 310; 30; 20 INJECTION, SOLUTION INTRAVENOUS at 07:43

## 2019-06-25 RX ADMIN — MORPHINE SULFATE 5 MG: 10 INJECTION, SOLUTION INTRAMUSCULAR; INTRAVENOUS at 12:52

## 2019-06-25 RX ADMIN — FENTANYL CITRATE 50 MCG: 50 INJECTION INTRAMUSCULAR; INTRAVENOUS at 14:03

## 2019-06-25 RX ADMIN — INSULIN LISPRO 4 UNITS: 100 INJECTION, SOLUTION INTRAVENOUS; SUBCUTANEOUS at 16:47

## 2019-06-25 RX ADMIN — MORPHINE SULFATE 5 MG: 10 INJECTION, SOLUTION INTRAMUSCULAR; INTRAVENOUS at 08:27

## 2019-06-25 RX ADMIN — FENTANYL CITRATE 50 MCG: 50 INJECTION, SOLUTION INTRAMUSCULAR; INTRAVENOUS at 08:25

## 2019-06-25 RX ADMIN — ONDANSETRON 4 MG: 2 INJECTION, SOLUTION INTRAMUSCULAR; INTRAVENOUS at 12:45

## 2019-06-25 RX ADMIN — SODIUM CHLORIDE, POTASSIUM CHLORIDE, SODIUM LACTATE AND CALCIUM CHLORIDE: 600; 310; 30; 20 INJECTION, SOLUTION INTRAVENOUS at 10:40

## 2019-06-25 RX ADMIN — Medication 0.8 MG: at 12:45

## 2019-06-25 RX ADMIN — KETOROLAC TROMETHAMINE 15 MG: 30 INJECTION, SOLUTION INTRAMUSCULAR at 20:45

## 2019-06-25 RX ADMIN — HYDROMORPHONE HYDROCHLORIDE 0.25 MG: 1 INJECTION, SOLUTION INTRAMUSCULAR; INTRAVENOUS; SUBCUTANEOUS at 14:52

## 2019-06-25 RX ADMIN — KETOROLAC TROMETHAMINE 15 MG: 30 INJECTION, SOLUTION INTRAMUSCULAR at 14:34

## 2019-06-25 RX ADMIN — ROCURONIUM BROMIDE 50 MG: 10 INJECTION INTRAVENOUS at 07:37

## 2019-06-25 RX ADMIN — SODIUM CHLORIDE, POTASSIUM CHLORIDE, SODIUM LACTATE AND CALCIUM CHLORIDE: 600; 310; 30; 20 INJECTION, SOLUTION INTRAVENOUS at 07:17

## 2019-06-25 RX ADMIN — SODIUM CHLORIDE 500 ML: 9 INJECTION, SOLUTION INTRAVENOUS at 19:15

## 2019-06-25 RX ADMIN — FENTANYL CITRATE 100 MCG: 50 INJECTION, SOLUTION INTRAMUSCULAR; INTRAVENOUS at 07:37

## 2019-06-25 RX ADMIN — DEXAMETHASONE 4 MG: 4 TABLET ORAL at 06:48

## 2019-06-25 RX ADMIN — CEFAZOLIN 3000 MG: 1 INJECTION, POWDER, FOR SOLUTION INTRAMUSCULAR; INTRAVENOUS at 10:50

## 2019-06-25 RX ADMIN — ONDANSETRON 4 MG: 2 INJECTION INTRAMUSCULAR; INTRAVENOUS at 06:51

## 2019-06-25 RX ADMIN — FENTANYL CITRATE 50 MCG: 50 INJECTION INTRAMUSCULAR; INTRAVENOUS at 14:11

## 2019-06-25 RX ADMIN — SODIUM CHLORIDE 500 ML: 9 INJECTION, SOLUTION INTRAVENOUS at 16:31

## 2019-06-25 ASSESSMENT — PULMONARY FUNCTION TESTS
PIF_VALUE: 38
PIF_VALUE: 35
PIF_VALUE: 30
PIF_VALUE: 37
PIF_VALUE: 38
PIF_VALUE: 2
PIF_VALUE: 37
PIF_VALUE: 37
PIF_VALUE: 26
PIF_VALUE: 30
PIF_VALUE: 2
PIF_VALUE: 37
PIF_VALUE: 29
PIF_VALUE: 0
PIF_VALUE: 37
PIF_VALUE: 37
PIF_VALUE: 28
PIF_VALUE: 30
PIF_VALUE: 35
PIF_VALUE: 24
PIF_VALUE: 24
PIF_VALUE: 37
PIF_VALUE: 2
PIF_VALUE: 0
PIF_VALUE: 9
PIF_VALUE: 28
PIF_VALUE: 37
PIF_VALUE: 2
PIF_VALUE: 37
PIF_VALUE: 37
PIF_VALUE: 33
PIF_VALUE: 39
PIF_VALUE: 2
PIF_VALUE: 35
PIF_VALUE: 2
PIF_VALUE: 27
PIF_VALUE: 38
PIF_VALUE: 37
PIF_VALUE: 7
PIF_VALUE: 2
PIF_VALUE: 37
PIF_VALUE: 37
PIF_VALUE: 28
PIF_VALUE: 36
PIF_VALUE: 37
PIF_VALUE: 0
PIF_VALUE: 2
PIF_VALUE: 37
PIF_VALUE: 28
PIF_VALUE: 29
PIF_VALUE: 38
PIF_VALUE: 30
PIF_VALUE: 37
PIF_VALUE: 35
PIF_VALUE: 2
PIF_VALUE: 37
PIF_VALUE: 13
PIF_VALUE: 28
PIF_VALUE: 37
PIF_VALUE: 37
PIF_VALUE: 33
PIF_VALUE: 33
PIF_VALUE: 37
PIF_VALUE: 7
PIF_VALUE: 37
PIF_VALUE: 36
PIF_VALUE: 31
PIF_VALUE: 37
PIF_VALUE: 30
PIF_VALUE: 35
PIF_VALUE: 24
PIF_VALUE: 34
PIF_VALUE: 29
PIF_VALUE: 32
PIF_VALUE: 33
PIF_VALUE: 31
PIF_VALUE: 37
PIF_VALUE: 29
PIF_VALUE: 1
PIF_VALUE: 30
PIF_VALUE: 14
PIF_VALUE: 37
PIF_VALUE: 31
PIF_VALUE: 37
PIF_VALUE: 10
PIF_VALUE: 37
PIF_VALUE: 2
PIF_VALUE: 37
PIF_VALUE: 38
PIF_VALUE: 37
PIF_VALUE: 37
PIF_VALUE: 38
PIF_VALUE: 37
PIF_VALUE: 29
PIF_VALUE: 12
PIF_VALUE: 37
PIF_VALUE: 0
PIF_VALUE: 33
PIF_VALUE: 2
PIF_VALUE: 35
PIF_VALUE: 36
PIF_VALUE: 27
PIF_VALUE: 35
PIF_VALUE: 18
PIF_VALUE: 37
PIF_VALUE: 37
PIF_VALUE: 30
PIF_VALUE: 30
PIF_VALUE: 37
PIF_VALUE: 1
PIF_VALUE: 37
PIF_VALUE: 37
PIF_VALUE: 32
PIF_VALUE: 13
PIF_VALUE: 37
PIF_VALUE: 39
PIF_VALUE: 30
PIF_VALUE: 30
PIF_VALUE: 29
PIF_VALUE: 2
PIF_VALUE: 37
PIF_VALUE: 37
PIF_VALUE: 30
PIF_VALUE: 28
PIF_VALUE: 35
PIF_VALUE: 0
PIF_VALUE: 29
PIF_VALUE: 31
PIF_VALUE: 38
PIF_VALUE: 37
PIF_VALUE: 35
PIF_VALUE: 35
PIF_VALUE: 36
PIF_VALUE: 38
PIF_VALUE: 32
PIF_VALUE: 37
PIF_VALUE: 38
PIF_VALUE: 28
PIF_VALUE: 37
PIF_VALUE: 30
PIF_VALUE: 12
PIF_VALUE: 38
PIF_VALUE: 29
PIF_VALUE: 31
PIF_VALUE: 37
PIF_VALUE: 16
PIF_VALUE: 28
PIF_VALUE: 32
PIF_VALUE: 34
PIF_VALUE: 37
PIF_VALUE: 30
PIF_VALUE: 30
PIF_VALUE: 29
PIF_VALUE: 30
PIF_VALUE: 35
PIF_VALUE: 36
PIF_VALUE: 30
PIF_VALUE: 37
PIF_VALUE: 37
PIF_VALUE: 18
PIF_VALUE: 35
PIF_VALUE: 11
PIF_VALUE: 38
PIF_VALUE: 0
PIF_VALUE: 12
PIF_VALUE: 35
PIF_VALUE: 35
PIF_VALUE: 7
PIF_VALUE: 30
PIF_VALUE: 37
PIF_VALUE: 31
PIF_VALUE: 1
PIF_VALUE: 31
PIF_VALUE: 5
PIF_VALUE: 37
PIF_VALUE: 1
PIF_VALUE: 35
PIF_VALUE: 37
PIF_VALUE: 37
PIF_VALUE: 31
PIF_VALUE: 37
PIF_VALUE: 37
PIF_VALUE: 33
PIF_VALUE: 31
PIF_VALUE: 30
PIF_VALUE: 35
PIF_VALUE: 29
PIF_VALUE: 38
PIF_VALUE: 37
PIF_VALUE: 38
PIF_VALUE: 37
PIF_VALUE: 34
PIF_VALUE: 32
PIF_VALUE: 37
PIF_VALUE: 39
PIF_VALUE: 37
PIF_VALUE: 38
PIF_VALUE: 38
PIF_VALUE: 37
PIF_VALUE: 31
PIF_VALUE: 31
PIF_VALUE: 27
PIF_VALUE: 38
PIF_VALUE: 37
PIF_VALUE: 29
PIF_VALUE: 30
PIF_VALUE: 32
PIF_VALUE: 37
PIF_VALUE: 38
PIF_VALUE: 31
PIF_VALUE: 37
PIF_VALUE: 29
PIF_VALUE: 37
PIF_VALUE: 32
PIF_VALUE: 37
PIF_VALUE: 35
PIF_VALUE: 37
PIF_VALUE: 35
PIF_VALUE: 37
PIF_VALUE: 37
PIF_VALUE: 34
PIF_VALUE: 31
PIF_VALUE: 14
PIF_VALUE: 38
PIF_VALUE: 38
PIF_VALUE: 2
PIF_VALUE: 10
PIF_VALUE: 16
PIF_VALUE: 38
PIF_VALUE: 37
PIF_VALUE: 37
PIF_VALUE: 38
PIF_VALUE: 2
PIF_VALUE: 37
PIF_VALUE: 8
PIF_VALUE: 38
PIF_VALUE: 17
PIF_VALUE: 35
PIF_VALUE: 32
PIF_VALUE: 37
PIF_VALUE: 37
PIF_VALUE: 30
PIF_VALUE: 0
PIF_VALUE: 37
PIF_VALUE: 2
PIF_VALUE: 38
PIF_VALUE: 4
PIF_VALUE: 37
PIF_VALUE: 31
PIF_VALUE: 38
PIF_VALUE: 2
PIF_VALUE: 29
PIF_VALUE: 31
PIF_VALUE: 37
PIF_VALUE: 37
PIF_VALUE: 21
PIF_VALUE: 31
PIF_VALUE: 18
PIF_VALUE: 27
PIF_VALUE: 30
PIF_VALUE: 37
PIF_VALUE: 31
PIF_VALUE: 37
PIF_VALUE: 28
PIF_VALUE: 37
PIF_VALUE: 37
PIF_VALUE: 29
PIF_VALUE: 30
PIF_VALUE: 9
PIF_VALUE: 29
PIF_VALUE: 13
PIF_VALUE: 30
PIF_VALUE: 37
PIF_VALUE: 37
PIF_VALUE: 38
PIF_VALUE: 37
PIF_VALUE: 35
PIF_VALUE: 37
PIF_VALUE: 37
PIF_VALUE: 30
PIF_VALUE: 37
PIF_VALUE: 37
PIF_VALUE: 36
PIF_VALUE: 25
PIF_VALUE: 27
PIF_VALUE: 36
PIF_VALUE: 31
PIF_VALUE: 37
PIF_VALUE: 30
PIF_VALUE: 29
PIF_VALUE: 37
PIF_VALUE: 37
PIF_VALUE: 29
PIF_VALUE: 31
PIF_VALUE: 38
PIF_VALUE: 38
PIF_VALUE: 37
PIF_VALUE: 38
PIF_VALUE: 32
PIF_VALUE: 37
PIF_VALUE: 37
PIF_VALUE: 28
PIF_VALUE: 37
PIF_VALUE: 1
PIF_VALUE: 31
PIF_VALUE: 32
PIF_VALUE: 30
PIF_VALUE: 27
PIF_VALUE: 31
PIF_VALUE: 31
PIF_VALUE: 37
PIF_VALUE: 37
PIF_VALUE: 35
PIF_VALUE: 37
PIF_VALUE: 30
PIF_VALUE: 37
PIF_VALUE: 38
PIF_VALUE: 37
PIF_VALUE: 27
PIF_VALUE: 37
PIF_VALUE: 9
PIF_VALUE: 37
PIF_VALUE: 37
PIF_VALUE: 28
PIF_VALUE: 37
PIF_VALUE: 38
PIF_VALUE: 37
PIF_VALUE: 37
PIF_VALUE: 30
PIF_VALUE: 0
PIF_VALUE: 36
PIF_VALUE: 36
PIF_VALUE: 35
PIF_VALUE: 1
PIF_VALUE: 38
PIF_VALUE: 29
PIF_VALUE: 26
PIF_VALUE: 35
PIF_VALUE: 27
PIF_VALUE: 38
PIF_VALUE: 32
PIF_VALUE: 37
PIF_VALUE: 28
PIF_VALUE: 31
PIF_VALUE: 37
PIF_VALUE: 38
PIF_VALUE: 40
PIF_VALUE: 35
PIF_VALUE: 37
PIF_VALUE: 27
PIF_VALUE: 38
PIF_VALUE: 38
PIF_VALUE: 28
PIF_VALUE: 35
PIF_VALUE: 27
PIF_VALUE: 33
PIF_VALUE: 37
PIF_VALUE: 35
PIF_VALUE: 37
PIF_VALUE: 2
PIF_VALUE: 0
PIF_VALUE: 38
PIF_VALUE: 37
PIF_VALUE: 38
PIF_VALUE: 37
PIF_VALUE: 37
PIF_VALUE: 30

## 2019-06-25 ASSESSMENT — PAIN SCALES - GENERAL
PAINLEVEL_OUTOF10: 6
PAINLEVEL_OUTOF10: 7
PAINLEVEL_OUTOF10: 9
PAINLEVEL_OUTOF10: 3
PAINLEVEL_OUTOF10: 5
PAINLEVEL_OUTOF10: 10
PAINLEVEL_OUTOF10: 3

## 2019-06-25 ASSESSMENT — ENCOUNTER SYMPTOMS
STRIDOR: 0
SHORTNESS OF BREATH: 0

## 2019-06-25 ASSESSMENT — PAIN - FUNCTIONAL ASSESSMENT: PAIN_FUNCTIONAL_ASSESSMENT: 0-10

## 2019-06-25 ASSESSMENT — PAIN DESCRIPTION - LOCATION: LOCATION: SHOULDER

## 2019-06-25 ASSESSMENT — PAIN DESCRIPTION - DESCRIPTORS: DESCRIPTORS: HEAVINESS

## 2019-06-25 NOTE — DISCHARGE SUMMARY
Gyn/Onc Discharge Summary  [facility]      Patient Name: Dariel Reese  Patient : 1980  Primary Care Physician: Mona Phillips MD  Admit Date: 2019    Principal Diagnosis: Endometrial adenocarcinoma, grade 1. Other Diagnosis:   S/P hysterectomy [Z90.710]  Patient Active Problem List   Diagnosis    Anemia    Morbid obesity with BMI of 50.0-59.9, adult ([address]    Heavy menses    Endometrioid adenocarcinoma of uterus ([address]    RALH, BSO, Cytologic washing, SLN mapping and biopsies, mini laparotomy, ICG dye 19    S/P hysterectomy    Postoperative anemia due to acute blood loss       Infection: No  Hospital Acquired: No    Surgical Operations & Procedures: XI ROBOTIC LAPAROSCOPIC HYSTERECTOMY, BSO , CYTOLOGIC WASHING, SENTINEL LYMPH NODE MAPPING AND BIOPSIES, LAPAOROTOMY, ICG DYE on 19    Consultations: Anesthesia    Pertinent Findings & Procedures:   Dariel Reese is a 41 y.o. female [id number], admitted for surgical management for endometrial cancer; received Ancef and Lovenox preoperatively. She underwent RALH, BSO, Cytologic washing, SLN mapping and biopsies, mini laparotomy, ICG dye on 19. UO low despite two 500mL NS bolus; stat CBC revealed Hgb of 6.2. Decision was made to proceed with blood transfusion. Patient amenable. Patient received 2U PBRCs postoperatively. NSAIDs and Lovenox held until repeat H&H. Repeat hgb 9.8. UO improved and mathias removed. Cr slightly increased from 0.6 to 0.83. NSAIDs continued to be held. Repeat H&H was 9.3. Post-op course normal, discharged home on 2019. Follow up in 2 weeks. Discharge instructions reviewed and questions answered.     Course of patient: normal    Discharge to: Home    Readmission planned: No    Recommendations on Discharge:     Medications:     Medication List      START taking these medications    acetaminophen 500 MG tablet  Commonly known as:  APAP EXTRA STRENGTH  Take 2 tablets by mouth every 6 hours as needed for Pain     docusate sodium 100 MG capsule  Commonly known as:  COLACE  Take 1 capsule by mouth daily as needed for Constipation     enoxaparin 40 MG/0.4ML injection  Commonly known as:  LOVENOX  Inject 0.4 mLs into the skin daily     ibuprofen 600 MG tablet  Commonly known as:  ADVIL;MOTRIN  Take 1 tablet by mouth every 6 hours as needed for Pain     ondansetron 4 MG disintegrating tablet  Commonly known as:  ZOFRAN-ODT  Take 1 tablet by mouth every 8 hours as needed for Nausea or Vomiting     simethicone 80 MG chewable tablet  Commonly known as:  MYLICON  Take 1 tablet by mouth 4 times daily as needed for Flatulence        STOP taking these medications    medroxyPROGESTERone 10 MG tablet  Commonly known as:  PROVERA        ASK your doctor about these medications    ferrous sulfate 325 (65 Fe) MG EC tablet  Take 1 tablet by mouth 3 times daily (with meals)           Where to Get Your Medications      You can get these medications from any pharmacy    Bring a paper prescription for each of these medications  · acetaminophen 500 MG tablet  · docusate sodium 100 MG capsule  · enoxaparin 40 MG/0.4ML injection  · ibuprofen 600 MG tablet  · ondansetron 4 MG disintegrating tablet  · simethicone 80 MG chewable tablet           Activity: pelvic rest x 6-8 weeks, no driving on narcotics, no lifting greater than 15 lbs  Diet: regular diet  Follow up: 2 weeks     Condition on discharge: good and stable   Discharge Date: 6/26/2019     Comments:  Home care, Follow-up care, restrictions reviewed.     Kristyn Thomas DO  Ob/Gyn Resident  [facility]  6/26/2019, 2:28 PM

## 2019-06-25 NOTE — H&P
OB/GYN Pre-Op H&P  [facility]    Patient Name: Whit Langley     Patient : 1980  Room/Bed: Trey /NONE  Admission Date/Time: 2019  5:20 AM  Primary Care Physician: Cecelia Payne MD  MRN: [id number]    Date: 2019  Time: 6:30 AM    The patient was seen in pre-op holding. She is here for surgical treatment of endometrial cancer. She denies any changes since seeing Dr. Stephany Oswald in the office on 19. She denies any vaginal bleeding this morning. \"HPI  She is a 41 y.o.  3, para 1, Ab2 female who was referred for a recent diagnosis of adenocarcinoma of the endometrium, grade 1. The patient stated that she had been having regular menstrual cycles lasting 3 days with the first day \"very heavy\" up until approximately 1-2 months ago. She had no bleeding in between. She then developed sudden severe bleeding and was admitted to [facility].  She was found to be very anemic with a hemoglobin of 4.1. She received 7 units of packed red blood cells and several doses of IV Venofer as well as oral iron.     A gynecology consultation was obtained as well as a hematology oncology consultation.     The patient was examined and an endometrial biopsy was performed that revealed endometrial adenocarcinoma, grade 1.     A transvaginal pelvic ultrasound was performed on May 6, 2019 and revealed an enlarged uterus measuring 16.6 x 8.7 x 9.5 cm. The endometrial stripe was 3.45 cm. Neither right nor left ovary was visualized. There was no free fluid noted in the pelvis.     The patient has been referred to [facility] gynecologic oncology for further workup and definitive therapy.     The patient states that since her D&C she has had very little bleeding but just \"daily spotting\". She is taking ferrous sulfate orally 3 times a day.     Her last hemoglobin was 8. 1.     The patient has lifelong obesity and was found to have a hemoglobin A1c of 7.0.   She Arnita Leventhal, MD        dexamethasone (DECADRON) tablet 4 mg  4 mg Oral Once Arnita Leventhal, MD        gabapentin (NEURONTIN) tablet 300 mg  300 mg Oral Once Arnita Leventhal, MD        lactated ringers infusion 1,000 mL  1,000 mL Intravenous Continuous Harshad Khoury MD        ondansetron (ZOFRAN) injection 4 mg  4 mg Intravenous Once Arnita Leventhal, MD           FAMILY HISTORY:  family history includes Cancer in her maternal grandmother; Colon Cancer in her maternal grandfather; Diabetes in her maternal grandfather; Heart Disease in her maternal grandfather; No Known Problems in her father, paternal grandfather, and paternal grandmother. SOCIAL HISTORY:   reports that she has never smoked. She has never used smokeless tobacco. She reports that she drank alcohol. She reports that she does not use drugs.     VITALS:  Vitals:    06/25/19 0613   BP: 111/68   Pulse: 81   Resp: 16   Temp: 96.8 °F (36 °C)   TempSrc: Temporal   SpO2: 100%   Weight: 286 lb (129.7 kg)   Height: 5' 2\" (1.575 m)                                                                                                                               PHYSICAL EXAM:     CONSTITUTIONAL:  Obese, Alert and oriented, no acute distress  HEAD: normocephalic, atraumatic  EYES: Pupils equal and reactive to light, Extraocular muscles intact, sclera non icteric  ENT: Mucus membranes moist, No otorrhea, no rhinorrhea  NECK:  supple, symmetrical, trachea midline   LUNGS:  Good air movement bilaterally, unlabored respirations, no wheezes or rhonchi  CARDIOVASCULAR: Regular rate and rhythm, no murmurs rubs or gallops  ABDOMEN: soft, non tender, non distended, no rebound or guarding, no hernias, no hepatomegaly, no splenomegly  MUSCULOSKELETAL:  Equal strength bilaterally, normal muscle tone  SKIN: No abscess or rash  NEUROLOGIC:  Cranial nerves 2-12 grossly intact, no focal deficits  PSYCH: affect appropriate  Pelvic Exam: deferred to OR      LAB Gravity, UA 06/11/2019 1.014  1.005 - 1.030 Final    Urine Hgb 06/11/2019 MODERATE* NEGATIVE Final    pH, UA 06/11/2019 6.5  5.0 - 8.0 Final    Protein, UA 06/11/2019 NEGATIVE  NEGATIVE Final    Urobilinogen, Urine 06/11/2019 Normal  Normal Final    Nitrite, Urine 06/11/2019 NEGATIVE  NEGATIVE Final    Leukocyte Esterase, Urine 06/11/2019 MODERATE* NEGATIVE Final    Urinalysis Comments 06/11/2019 NOT REPORTED   Final     06/11/2019 42* <38 U/mL Final    Glucose 06/11/2019 103* 70 - 99 mg/dL Final    BUN 06/11/2019 10  6 - 20 mg/dL Final    CREATININE 06/11/2019 0.73  0.50 - 0.90 mg/dL Final    Bun/Cre Ratio 06/11/2019 NOT REPORTED  9 - 20 Final    Calcium 06/11/2019 9.3  8.6 - 10.4 mg/dL Final    Sodium 06/11/2019 140  135 - 144 mmol/L Final    Potassium 06/11/2019 3.7  3.7 - 5.3 mmol/L Final    Chloride 06/11/2019 105  98 - 107 mmol/L Final    CO2 06/11/2019 21  20 - 31 mmol/L Final    Anion Gap 06/11/2019 14  9 - 17 mmol/L Final    Alkaline Phosphatase 06/11/2019 39  35 - 104 U/L Final    ALT 06/11/2019 10  5 - 33 U/L Final    AST 06/11/2019 13  <32 U/L Final    Total Bilirubin 06/11/2019 0.46  0.3 - 1.2 mg/dL Final    Total Protein 06/11/2019 7.8  6.4 - 8.3 g/dL Final    Alb 06/11/2019 4.4  3.5 - 5.2 g/dL Final    Albumin/Globulin Ratio 06/11/2019 1.3  1.0 - 2.5 Final    GFR Non- 06/11/2019 >60  >60 mL/min Final    GFR  06/11/2019 >60  >60 mL/min Final    GFR Comment 06/11/2019        Final    Comment: Average GFR for 30-36 years old:   80 mL/min/1.73sq m  Chronic Kidney Disease:   <60 mL/min/1.73sq m  Kidney failure:   <15 mL/min/1.73sq m              eGFR calculated using average adult body mass.  Additional eGFR calculator available at:        [email or web address].br            GFR Staging 06/11/2019 NOT REPORTED   Final    Ventricular Rate 06/11/2019 63  BPM Final    Atrial Rate 06/11/2019 63  BPM Final    P-R Interval 06/11/2019 140  ms Final    QRS Duration 06/11/2019 90  ms Final    Q-T Interval 06/11/2019 412  ms Final    QTc Calculation (Bazett) 06/11/2019 421  ms Final    P Axis 06/11/2019 27  degrees Final    R Axis 06/11/2019 24  degrees Final    T Axis 06/11/2019 4  degrees Final    Platelet, Immature Fraction 06/11/2019 7.2  1.1 - 10.3 % Final    ORDERED BY LAB    Platelet, Fluorescence 06/11/2019 273  138 - 453 k/uL Final    ORDERED BY LAB    HCG(Urine) Pregnancy Test 06/11/2019 NEGATIVE  NEGATIVE Final    Comment: Specimens with hCG levels near the threshold of the test (25 mIU/mL) may give a negative or   indeterminate result. In such cases, another test should be performed with a new specimen   in 48-72 hours. If early pregnancy is suspected clinically in this setting, correlation   with quantitative serum b-hCG level is suggested.  - 06/11/2019        Final    WBC, UA 06/11/2019 10 TO 20  0 - 5 /HPF Final    RBC, UA 06/11/2019 20 TO 50  0 - 4 /HPF Final    Reference range defined for non-centrifuged specimen.  Casts UA 06/11/2019 2 TO 5 HYALINE Reference range defined for non-centrifuged specimen. 0 - 8 /LPF Final    Crystals UA 06/11/2019 NOT REPORTED  None /HPF Final    Epithelial Cells UA 06/11/2019 2 TO 5  0 - 5 /HPF Final    Renal Epithelial, Urine 06/11/2019 NOT REPORTED  0 /HPF Final    Bacteria, UA 06/11/2019 NOT REPORTED  None Final    Mucus, UA 06/11/2019 NOT REPORTED  None Final    Trichomonas, UA 06/11/2019 NOT REPORTED  None Final    Amorphous, UA 06/11/2019 NOT REPORTED  None Final    Other Observations UA 06/11/2019 NOT REPORTED  NOT REQ. Final    Yeast, UA 06/11/2019 NOT REPORTED  None Final       DIAGNOSTICS:  Xr Chest Standard (2 Vw)    Result Date: 6/11/2019  EXAMINATION: TWO XRAY VIEWS OF THE CHEST 6/11/2019 10:27 am COMPARISON: None.  HISTORY: ORDERING SYSTEM PROVIDED HISTORY: Endometrioid adenocarcinoma of uterus [facility]) TECHNOLOGIST PROVIDED HISTORY: pre-op patient. She is already on Provera 3 times a day and is on oral iron. I would not recommend observation or hormonal therapy alone. We discussed radiation therapy briefly as an option, however I feel the patient is healthy enough for a surgical approach. This would be difficult because of her size and the patient is well aware of this. She also is probably an undiagnosed diabetic and will need to have an insulin sliding scale at least while she is in the hospital.     The patient has no medical physician and we will need to obtain one for medical clearance before the surgery and one to follow after the surgery as well.     I mention the various surgical approaches and I feel that she may be a candidate for robotic minimally invasive approach. If we are unable to deliver the uterus vaginally then we would place it in the bag and remove it through a smaller abdominal incision. I informed her that there is always the possibility of a traditional vertical laparotomy incision. All questions were answered to her satisfaction.     The benefits of the procedure would be to rid her of the cancer along with its consequences area this would also help her reverse her anemia because of the bleeding.     The risks of the procedure are as delineated below and were also reviewed with the patient and her grandmother. All questions were answered to her satisfaction.       Olga Springer DO  Ob/Gyn Resident  Pager: [phone]  [address] [place] [address]  6/25/2019, 6:30 AM

## 2019-06-25 NOTE — BRIEF OP NOTE
Brief Postoperative Note  ______________________________________________________________    Patient: Kathy Del Castillo  YOB: 1980  MRN: [id number]  Date of Procedure: 6/25/2019    Pre-Op Diagnosis: ENDOMETRIAL CANCER    Post-Op Diagnosis: Same       Procedure(s):  XI ROBOTIC LAPAROSCOPIC HYSTERECTOMY, BSO , CYTOLOGIC WASHING, SENTINEL LYMPH NODE MAPPING AND BIOPSIES, LAPAOROTOMY, ICG DYE    Anesthesia: General    Surgeon(s):  Amy Meek MD    Assistant: Dr Yamileth Nam, Zander Moseley PA-C    Estimated Blood Loss (mL): 643     Complications: None    Specimens:   ID Type Source Tests Collected by Time Destination   1 : URINE FOR CULTURE Urine Urine, catheter URINE CULTURE Amy Meek MD 6/25/2019 1307    A : PELVIC WASHINGS Body Fluid Pelvic Washings CYTOLOGY, NON-GYN Amy Meek MD 6/25/2019 0900    B : RIGHT OBTRATOR AND ILIAC SENTINEL LYMPH NODE Tissue Lymph Node SURGICAL PATHOLOGY Amy Meek MD 6/25/2019 0916    C : LEFT OBTRATOR SENTINEL LYMPH NODE Tissue Lymph Node SURGICAL PATHOLOGY Amy Meek MD 6/25/2019 5036    D : LEFT EXTERNAL ILIAC LYMPH NODES Tissue Lymph Node SURGICAL PATHOLOGY Amy Meek MD 6/25/2019 7076    E : UTERUS, CERVIX, BILATERAL FALLOPIAN TUBES AND OVARIES Tissue Uterus SURGICAL PATHOLOGY Amy Meek MD 6/25/2019 1210        Implants:        Drains:   Negative Pressure Wound Therapy Abdomen Anterior;Medial;Lower (Active)       Urethral Catheter Double-lumen;Non-latex;Straight-tip 16 fr (Active)       Findings:  No metastasis. Bilateral snl found and removed. Very large uterus;  Therefore laparotomy done for removal.    Leydi Viera MD  Date: 6/25/2019  Time: 1:29 PM

## 2019-06-25 NOTE — ANESTHESIA PRE PROCEDURE
Department of Anesthesiology  Preprocedure Note       Name:  Aman Talley   Age:  41 y.o.  :  1980                                          MRN:  [id number]         Date:  2019      Surgeon: Soni Vaughan):  Natasha Jones MD    Procedure: XI ROBOTIC LAPAROSCOPIC HYSTERECTOMY, BSO , CYTOLOGIC WASHING, SENTINEL LYMPH NODE MAPPING AND BIOPSIES, POSSIBLE LAPAOROTOMY, ICG DYE (N/A )    Medications prior to admission:   Prior to Admission medications    Medication Sig Start Date End Date Taking? Authorizing Provider   medroxyPROGESTERone (PROVERA) 10 MG tablet Take 1 tablet by mouth 3 times daily 19 Yes Lo Lennon PA-C   ferrous sulfate 325 (65 Fe) MG EC tablet Take 1 tablet by mouth 3 times daily (with meals) 19   Courtney Noguera MD       Current medications:    Current Facility-Administered Medications   Medication Dose Route Frequency Provider Last Rate Last Dose    0.9 % sodium chloride infusion   Intravenous Continuous Natasha Jones MD        ceFAZolin (ANCEF) 3 g in dextrose 5 % 100 mL IVPB  3 g Intravenous On Call to [address] [place] [address],[address], MD        sodium chloride flush 0.9 % injection 10 mL  10 mL Intravenous 2 times per day Natasha Jones MD        sodium chloride flush 0.9 % injection 10 mL  10 mL Intravenous PRN Natasha Jones MD        lactated ringers infusion 1,000 mL  1,000 mL Intravenous Continuous Teresita Villanueva MD           Allergies:     Allergies   Allergen Reactions    Metformin And Related Other (See Comments)     Abdominal cramps       Problem List:    Patient Active Problem List   Diagnosis Code    Anemia D64.9    Morbid obesity with BMI of 50.0-59.9, adult ([facility]) E66.01, Z68.43    Heavy menses N92.0    Endometrioid adenocarcinoma of uterus ([address] C55       Past Medical History:        Diagnosis Date    Abnormal uterine bleeding 2019    7 units of bloood, admiitted    Anemia     Endometrioid adenocarcinoma of uterus ([address] 2019    H/O transfusion May 5-8 2019    7 units     Obesity     Pre-diabetes     no RX currently, did not tolerate metformin    Snores     Tooth discoloration     left upper front tooth- has a hyperpigmented crack - from childhood    Wears contact lenses        Past Surgical History:        Procedure Laterality Date    DILATION AND CURETTAGE OF UTERUS  1997, 1999    x2       Social History:    Social History     Tobacco Use    Smoking status: Never Smoker    Smokeless tobacco: Never Used   Substance Use Topics    Alcohol use: Not Currently     Frequency: Monthly or less     Comment: occasional                                Counseling given: Not Answered      Vital Signs (Current):   Vitals:    06/25/19 0613   BP: 111/68   Pulse: 81   Resp: 16   Temp: 96.8 °F (36 °C)   TempSrc: Temporal   SpO2: 100%   Weight: 286 lb (129.7 kg)   Height: 5' 2\" (1.575 m)                                              BP Readings from Last 3 Encounters:   06/25/19 111/68   06/21/19 135/89   06/11/19 128/71       NPO Status: Time of last liquid consumption: 2000                        Time of last solid consumption: 1900                        Date of last liquid consumption: 06/24/19                        Date of last solid food consumption: 06/23/19    BMI:   Wt Readings from Last 3 Encounters:   06/25/19 286 lb (129.7 kg)   06/21/19 294 lb 12.8 oz (133.7 kg)   06/11/19 293 lb (132.9 kg)     Body mass index is 52.31 kg/m².     CBC:   Lab Results   Component Value Date    WBC 8.8 06/11/2019    RBC 4.35 06/11/2019    HGB 8.7 06/11/2019    HCT 33.2 06/11/2019    MCV 76.3 06/11/2019    RDW 21.8 06/11/2019    PLT See Reflexed IPF Result 06/11/2019       CMP:   Lab Results   Component Value Date     06/11/2019    K 3.7 06/11/2019     06/11/2019    CO2 21 06/11/2019    BUN 10 06/11/2019    CREATININE 0.73 06/11/2019    GFRAA >60 06/11/2019    LABGLOM >60 06/11/2019    GLUCOSE 103 06/11/2019    PROT 7.8 06/11/2019    CALCIUM 9.3 06/11/2019    BILITOT 0.46 06/11/2019    ALKPHOS 39 06/11/2019    AST 13 06/11/2019    ALT 10 06/11/2019       POC Tests:   Recent Labs     06/25/19  0648   POCHEMO 10.2*   POCHCT 30*       Coags:   Lab Results   Component Value Date    PROTIME 14.7 05/05/2019    INR 1.1 05/05/2019       HCG (If Applicable):   Lab Results   Component Value Date    PREGTESTUR NEGATIVE 06/11/2019        ABGs: No results found for: PHART, PO2ART, NSU8FKV, JRF7OSY, BEART, L3QUWNEH     Type & Screen (If Applicable):  No results found for: LABABO, LABRH    Anesthesia Evaluation   no history of anesthetic complications:   Airway: Mallampati: III     Neck ROM: full  Mouth opening: > = 3 FB Dental:          Pulmonary:       (-) COPD, asthma, shortness of breath, sleep apnea and stridor                           Cardiovascular:        (-) pacemaker, CABG/stent,  angina and  CHF        Rate: normal                    Neuro/Psych:      (-) seizures, TIA and CVA           GI/Hepatic/Renal:        (-) GERD, liver disease and no renal disease       Endo/Other:    (+) malignancy/cancer. (-) diabetes mellitus, hypothyroidism, hyperthyroidism               Abdominal:   (+) obese,     Abdomen: soft. Vascular:                                        Anesthesia Plan      general     ASA 2       Induction: intravenous. Anesthetic plan and risks discussed with patient (family present). Use of blood products discussed with patient whom consented to blood products.                    Bon Rincon MD   6/25/2019

## 2019-06-25 NOTE — ANESTHESIA POSTPROCEDURE EVALUATION
Department of Anesthesiology  Postprocedure Note    Patient: Lucia Grier  MRN: [id number]  YOB: 1980  Date of evaluation: 6/25/2019  Time:  2:56 PM     Procedure Summary     Date:  06/25/19 Room / Location:  [facility] OR  / [facility] OR    Anesthesia Start:  0730 Anesthesia Stop:  0659    Procedure:  XI ROBOTIC LAPAROSCOPIC HYSTERECTOMY, BSO , CYTOLOGIC WASHING, SENTINEL LYMPH NODE MAPPING AND BIOPSIES, LAPAOROTOMY, ICG DYE (N/A ) Diagnosis:  (ENDOMETRIAL CANCER)    Surgeon:  Louis Forrest MD Responsible Provider:  Saroj Peoples MD    Anesthesia Type:  general ASA Status:  2          Anesthesia Type: general    Nicki Phase I:      Nicki Phase II:      Last vitals: Reviewed and per EMR flowsheets. Anesthesia Post Evaluation    Patient location during evaluation: PACU  Patient participation: complete - patient participated  Level of consciousness: awake  Pain score: 4  Airway patency: patent  Nausea & Vomiting: no vomiting  Complications: no  Cardiovascular status: hemodynamically stable  Respiratory status: spontaneous ventilation  Hydration status: stable  Comments: No belly pain. Pain in shoulder to upper arm. Arms feel heavy. Good sensation.  Good strength 5/5

## 2019-06-25 NOTE — PROGRESS NOTES
Cranial nerves 2-12 grossly intact, no focal deficits  PSYCH: affect appropriate      Lab:  Results for orders placed or performed during the hospital encounter of 06/25/19   Hemoglobin and hematocrit, blood   Result Value Ref Range    POC Hemoglobin 10.2 (L) 12.0 - 16.0 g/dL    POC Hematocrit 30 (L) 36 - 46 %   Cytology, Non-Gyn   Result Value Ref Range    Specimen Description NOT REPORTED     Case Number: [id number]    POCT urine pregnancy   Result Value Ref Range    HCG, Pregnancy Urine (POC) NEGATIVE NEGATIVE   TYPE AND SCREEN   Result Value Ref Range    Expiration Date 06/28/2019     Arm Band Number [id number]     ABO/Rh A POSITIVE     Antibody Screen NEGATIVE          Assessment/Plan:  Luis Antonio Snider 41 y.o. female [id number], POD #0 s/p RALH, BSO, Cytologic washing, SLN mapping and biopsies utilizing ICG dye, mini laparotomy on 6/25/19   - Doing well, vitals stable   - Continue mathias catheter, UOP 30mL in the last hour very light yellow in mathias bag   - Plan for mathias out at 0200 on POD#1 if UO adequate   - Will bolus PRN as needed for low urine output    - Encourage ambulation and use of incentive spirometer   - Pain control: Toradol and Dilaudid prn, plan to transition to Motrin/Norco    - Labs: CBC/BMP daily   - DVT Prophylaxis: SCDs and Lovenox 40mg QD   - Abx: S/p Ancef x 2 doses   - Diet: diabetic, carb controlled, medium dose ISS   - Check glucose AC/HS   - IVF: IVF D5 1/2NS w/ 20 mEq K+ @100ml/hr   - UCx pending   - Pathology: pending      Active Problems:    RALH, BSO, Cytologic washing, SLN mapping and biopsies, mini laparotomy, ICG dye 6/25/19    S/P hysterectomy  Resolved Problems:    * No resolved hospital problems. *      Please Perfect Serve or page the resident named below with questions and concerns.     Sawyer Zarco DO  OBGYN Resident  Pgr: [phone]  6/25/2019 4:25 PM

## 2019-06-25 NOTE — PROGRESS NOTES
Dr Dilcia Garcia called regarding patient heaviness of arms due to pain in shoulders, up to see patient.

## 2019-06-25 NOTE — CARE COORDINATION
Case Management Initial Discharge Plan  Khurram Avalos,             Met with:patient to discuss discharge plans. Information verified: address, contacts, phone number, , insurance Yes  PCP: Clement Rizo MD  Date of last visit: 1 1/2 mths ago    Insurance Provider:     Discharge Planning    Living Arrangements:  [place]:  Family Members    Home has 2 stories  3 stairs to climb to get into front door, 14stairs to climb to reach second floor  Location of bedroom/bathroom in home bed up bath down    Patient able to perform ADL's:Independent    Current Services (outpatient & in home) none  DME equipment:   DME provider:     Pharmacy: meds to beds   Potential Assistance Purchasing Medications:  No  Does patient want to participate in local refill/ meds to beds program?  Yes    Potential Assistance Needed:  N/A    Patient agreeable to home care: No  Henry of choice provided:  n/a    Prior SNF/Rehab Placement and Facility: none  Agreeable to SNF/Rehab: No  Henry of choice provided: n/a   Evaluation: no    Expected Discharge date:  19  Patient expects to be discharged to:  home  Follow Up Appointment: Best Day/ Time:      Transportation provider: mom or grandmother  Transportation arrangements needed for discharge: No    Readmission Risk              Risk of Unplanned Readmission:        9             Does patient have a readmission risk score greater than 14?: No  If yes, follow-up appointment must be made within 7 days of discharge.      Discharge Plan: home with Mom state aic elevated monitor for addll needs          Electronically signed by Diana Cole RN on 19 at 6:30 PM

## 2019-06-26 VITALS
RESPIRATION RATE: 18 BRPM | HEART RATE: 81 BPM | SYSTOLIC BLOOD PRESSURE: 129 MMHG | WEIGHT: 286 LBS | HEIGHT: 62 IN | DIASTOLIC BLOOD PRESSURE: 60 MMHG | BODY MASS INDEX: 52.63 KG/M2 | OXYGEN SATURATION: 100 % | TEMPERATURE: 98.3 F

## 2019-06-26 DIAGNOSIS — D50.0 IRON DEFICIENCY ANEMIA DUE TO CHRONIC BLOOD LOSS: Primary | ICD-10-CM

## 2019-06-26 PROBLEM — D62 POSTOPERATIVE ANEMIA DUE TO ACUTE BLOOD LOSS: Status: ACTIVE | Noted: 2019-06-26

## 2019-06-26 LAB
ANION GAP SERPL CALCULATED.3IONS-SCNC: 11 MMOL/L (ref 9–17)
BUN BLDV-MCNC: 10 MG/DL (ref 6–20)
BUN/CREAT BLD: ABNORMAL (ref 9–20)
CALCIUM SERPL-MCNC: 8.8 MG/DL (ref 8.6–10.4)
CHLORIDE BLD-SCNC: 104 MMOL/L (ref 98–107)
CO2: 20 MMOL/L (ref 20–31)
CREAT SERPL-MCNC: 0.83 MG/DL (ref 0.5–0.9)
CULTURE: NO GROWTH
GFR AFRICAN AMERICAN: >60 ML/MIN
GFR NON-AFRICAN AMERICAN: >60 ML/MIN
GFR SERPL CREATININE-BSD FRML MDRD: ABNORMAL ML/MIN/{1.73_M2}
GFR SERPL CREATININE-BSD FRML MDRD: ABNORMAL ML/MIN/{1.73_M2}
GLUCOSE BLD-MCNC: 142 MG/DL (ref 65–105)
GLUCOSE BLD-MCNC: 145 MG/DL (ref 65–105)
GLUCOSE BLD-MCNC: 165 MG/DL (ref 70–99)
GLUCOSE BLD-MCNC: 175 MG/DL (ref 65–105)
HCT VFR BLD CALC: 32.9 % (ref 36.3–47.1)
HCT VFR BLD CALC: 37.2 % (ref 36.3–47.1)
HEMOGLOBIN: 9.3 G/DL (ref 11.9–15.1)
HEMOGLOBIN: 9.8 G/DL (ref 11.9–15.1)
Lab: NORMAL
POTASSIUM SERPL-SCNC: 4.6 MMOL/L (ref 3.7–5.3)
SODIUM BLD-SCNC: 135 MMOL/L (ref 135–144)
SPECIMEN DESCRIPTION: NORMAL
SURGICAL PATHOLOGY REPORT: NORMAL

## 2019-06-26 PROCEDURE — 58548 LAP RADICAL HYST: CPT | Performed by: OBSTETRICS & GYNECOLOGY

## 2019-06-26 PROCEDURE — 82947 ASSAY GLUCOSE BLOOD QUANT: CPT

## 2019-06-26 PROCEDURE — 2580000003 HC RX 258: Performed by: STUDENT IN AN ORGANIZED HEALTH CARE EDUCATION/TRAINING PROGRAM

## 2019-06-26 PROCEDURE — 80048 BASIC METABOLIC PNL TOTAL CA: CPT

## 2019-06-26 PROCEDURE — 86900 BLOOD TYPING SEROLOGIC ABO: CPT

## 2019-06-26 PROCEDURE — 85014 HEMATOCRIT: CPT

## 2019-06-26 PROCEDURE — 6360000002 HC RX W HCPCS: Performed by: STUDENT IN AN ORGANIZED HEALTH CARE EDUCATION/TRAINING PROGRAM

## 2019-06-26 PROCEDURE — 85018 HEMOGLOBIN: CPT

## 2019-06-26 PROCEDURE — 6370000000 HC RX 637 (ALT 250 FOR IP): Performed by: STUDENT IN AN ORGANIZED HEALTH CARE EDUCATION/TRAINING PROGRAM

## 2019-06-26 PROCEDURE — 36415 COLL VENOUS BLD VENIPUNCTURE: CPT

## 2019-06-26 PROCEDURE — P9016 RBC LEUKOCYTES REDUCED: HCPCS

## 2019-06-26 RX ORDER — ACETAMINOPHEN 500 MG
1000 TABLET ORAL EVERY 6 HOURS PRN
Qty: 120 TABLET | Refills: 3 | Status: SHIPPED | OUTPATIENT
Start: 2019-06-26 | End: 2019-07-31

## 2019-06-26 RX ORDER — 0.9 % SODIUM CHLORIDE 0.9 %
500 INTRAVENOUS SOLUTION INTRAVENOUS ONCE
Status: COMPLETED | OUTPATIENT
Start: 2019-06-26 | End: 2019-06-26

## 2019-06-26 RX ORDER — DOCUSATE SODIUM 100 MG/1
100 CAPSULE, LIQUID FILLED ORAL DAILY PRN
Qty: 60 CAPSULE | Refills: 0 | Status: SHIPPED | OUTPATIENT
Start: 2019-06-26

## 2019-06-26 RX ORDER — CALCIUM CARBONATE 200(500)MG
500 TABLET,CHEWABLE ORAL 3 TIMES DAILY PRN
Status: DISCONTINUED | OUTPATIENT
Start: 2019-06-26 | End: 2019-06-26 | Stop reason: HOSPADM

## 2019-06-26 RX ORDER — ONDANSETRON 4 MG/1
4 TABLET, ORALLY DISINTEGRATING ORAL EVERY 8 HOURS PRN
Qty: 20 TABLET | Refills: 0 | Status: SHIPPED | OUTPATIENT
Start: 2019-06-26

## 2019-06-26 RX ORDER — IBUPROFEN 600 MG/1
600 TABLET ORAL EVERY 6 HOURS PRN
Qty: 30 TABLET | Refills: 0 | Status: SHIPPED | OUTPATIENT
Start: 2019-06-26 | End: 2019-07-31

## 2019-06-26 RX ORDER — SIMETHICONE 80 MG
80 TABLET,CHEWABLE ORAL 4 TIMES DAILY PRN
Qty: 180 TABLET | Refills: 3 | Status: SHIPPED | OUTPATIENT
Start: 2019-06-26 | End: 2019-07-31

## 2019-06-26 RX ADMIN — POTASSIUM CHLORIDE 10 MEQ: 7.46 INJECTION, SOLUTION INTRAVENOUS at 00:40

## 2019-06-26 RX ADMIN — POTASSIUM BICARBONATE 40 MEQ: 782 TABLET, EFFERVESCENT ORAL at 00:06

## 2019-06-26 RX ADMIN — ACETAMINOPHEN 650 MG: 325 TABLET ORAL at 12:50

## 2019-06-26 RX ADMIN — SODIUM CHLORIDE 500 ML: 9 INJECTION, SOLUTION INTRAVENOUS at 09:09

## 2019-06-26 RX ADMIN — DOCUSATE SODIUM 100 MG: 100 CAPSULE, LIQUID FILLED ORAL at 09:09

## 2019-06-26 RX ADMIN — ENOXAPARIN SODIUM 40 MG: 40 INJECTION SUBCUTANEOUS at 16:47

## 2019-06-26 RX ADMIN — HYDROCODONE BITARTRATE AND ACETAMINOPHEN 2 TABLET: 5; 325 TABLET ORAL at 14:12

## 2019-06-26 RX ADMIN — SODIUM CHLORIDE 250 ML: 9 INJECTION, SOLUTION INTRAVENOUS at 04:01

## 2019-06-26 RX ADMIN — SIMETHICONE 80 MG: 80 TABLET, CHEWABLE ORAL at 12:50

## 2019-06-26 ASSESSMENT — PAIN SCALES - GENERAL
PAINLEVEL_OUTOF10: 6
PAINLEVEL_OUTOF10: 4
PAINLEVEL_OUTOF10: 7

## 2019-06-26 ASSESSMENT — PAIN DESCRIPTION - LOCATION: LOCATION: ABDOMEN

## 2019-06-26 NOTE — PROGRESS NOTES
Gynecology Progress Note      Pao Thibodeaux is a 41 y.o. female , POD # 0 s/p RALH, BSO, Cytologic washing, SLN mapping and biopsies utilizing ICG dye, mini laparotomy     Patient seen and examined. She is sitting up at the bedside. Pain is somewhat controlled. Patient is  tolerating oral intake. She is urinating very minimally into her mathias catheter. She denies any vaginal bleeding. She is ambulating within her room. She is not passing flatus. She denies Fever/Chills, Chest Pain, SOB, N/V, Calf Pain. Vitals:  Vitals:    19 1600 19 2133 19 2241 19 2241   BP: 123/73 124/82 (P) 124/62 124/62   Pulse: 94 86 (P) 71 71   Resp: 16 18 (P) 16 16   Temp: 98 °F (36.7 °C) 98.7 °F (37.1 °C) (P) 98.1 °F (36.7 °C) 98.1 °F (36.7 °C)   TempSrc: Oral Oral     SpO2: 92% 98% (P) 99% 99%   Weight:       Height:             Intake/Output:   Last Shift: I/O last 3 completed shifts:   In:  [I.V.:2000]  Out: 250 [Urine:150; Blood:100]  Current Shift: I/O this shift:  In: 227 [P.O.:50; I.V.:177]  Out: 30 [Urine:30]    50 clr UOP/ 3.5 hr    Physical Exam:  General:  no apparent distress, alert and cooperative  Neurologic:  alert, oriented, normal speech, no focal findings or movement disorder noted  Lungs:  No increased work of breathing, good air exchange, clear to auscultation bilaterally, no crackles or wheezing  Heart:  regular rate and rhythm and no murmur    Abdomen: soft, non-distended, appropriate tenderness, no CVA tenderness, hypoactive bowel sounds  Incision: clean, dry and intact, Prevena wound vac in place over vertical mini-lap  Extremities:  no calf tenderness, non edematous    Lab:  Recent Results (from the past 12 hour(s))   POC Glucose Fingerstick    Collection Time: 19  2:30 PM   Result Value Ref Range    POC Glucose 202 (H) 65 - 105 mg/dL   POC Glucose Fingerstick    Collection Time: 19  4:40 PM   Result Value Ref Range    POC Glucose 190 (H) 65 - 105 mg/dL   POC Glucose Fingerstick    Collection Time: 06/25/19  8:35 PM   Result Value Ref Range    POC Glucose 153 (H) 65 - 105 mg/dL   CBC WITH AUTO DIFFERENTIAL    Collection Time: 06/25/19  8:52 PM   Result Value Ref Range    WBC 11.3 3.5 - 11.3 k/uL    RBC 3.29 (L) 3.95 - 5.11 m/uL    Hemoglobin 6.2 (LL) 11.9 - 15.1 g/dL    Hematocrit 24.5 (L) 36.3 - 47.1 %    MCV 74.5 (L) 82.6 - 102.9 fL    MCH 18.8 (L) 25.2 - 33.5 pg    MCHC 25.3 (L) 28.4 - 34.8 g/dL    RDW 21.2 (H) 11.8 - 14.4 %    Platelets See Reflexed IPF Result 138 - 453 k/uL    MPV NOT REPORTED 8.1 - 13.5 fL    NRBC Automated 0.0 0.0 per 100 WBC    Differential Type NOT REPORTED     WBC Morphology NOT REPORTED     RBC Morphology NOT REPORTED     Platelet Estimate NOT REPORTED     Immature Granulocytes 0 0 %    Seg Neutrophils 97 (H) 36 - 66 %    Lymphocytes 2 (L) 24 - 44 %    Monocytes 1 1 - 7 %    Eosinophils % 0 (L) 1 - 4 %    Basophils 0 0 - 2 %    Absolute Immature Granulocyte 0.00 0.00 - 0.30 k/uL    Segs Absolute 10.96 (H) 1.8 - 7.7 k/uL    Absolute Lymph # 0.23 (L) 1.0 - 4.8 k/uL    Absolute Mono # 0.11 0.1 - 0.8 k/uL    Absolute Eos # 0.00 0.0 - 0.4 k/uL    Basophils # 0.00 0.0 - 0.2 k/uL    Morphology ANISOCYTOSIS PRESENT     Morphology MICROCYTOSIS PRESENT     Morphology HYPOCHROMIA PRESENT     Morphology 1+ ELLIPTOCYTES     Morphology 1+ TEARDROPS    BASIC METABOLIC PANEL    Collection Time: 06/25/19  8:52 PM   Result Value Ref Range    Glucose 170 (H) 70 - 99 mg/dL    BUN 7 6 - 20 mg/dL    CREATININE 0.60 0.50 - 0.90 mg/dL    Bun/Cre Ratio NOT REPORTED 9 - 20    Calcium 5.9 (LL) 8.6 - 10.4 mg/dL    Sodium 141 135 - 144 mmol/L    Potassium 2.9 (LL) 3.7 - 5.3 mmol/L    Chloride 116 (H) 98 - 107 mmol/L    CO2 17 (L) 20 - 31 mmol/L    Anion Gap 8 (L) 9 - 17 mmol/L    GFR Non-African American >60 >60 mL/min    GFR African American >60 >60 mL/min    GFR Comment          GFR Staging NOT REPORTED    Immature Platelet Fraction    Collection Time: 06/25/19  8:52 PM Result Value Ref Range    Platelet, Immature Fraction 4.4 1.1 - 10.3 %    Platelet, Fluorescence 211 138 - 453 k/uL         Assessment/Plan:  Eulalio Arita 41 y.o. female [id number], POD #0 s/p RALH, BSO, Cytologic washing, SLN mapping and biopsies utilizing ICG dye, mini laparotomy    - Doing well, vitals stable   - continue mathias catheter, UOP inadequate despite 1L NS bolus. Will continue to monitor. 500 ml NS bolus ordered now per Dr. Kimani Ye. - Encourage ambulation and use of incentive spirometer   - Pain control: Dilaudid/[place]   - Labs: BMP 6/26 AM   - DVT Proph: Lovenox held for decreased Urine Output at this time. SCDs. - Abx: s/p 2g Ancef pre-operatively   - Diet: Diabetic diet   - IVF: NS @ 125 ml/hr   - Path: pending    Anemia of acute blood loss   - Most recent Hgb 6.2    - Written consent obtained for 2U PRBCs   - Check H/H 1 hr post-transfusion.    - Will obtain H/H Qhr.   - CT Abd/pelvis if repeat Hgb < 7.0    Diabetes Mellitus Type 2   - Med Dose ISS   - Blood sugars AC/HS    Dr. Kimani Ye updated Dr. Dhiraj Torre and both agreeable with above plan. Active Problems:    RALH, BSO, Cytologic washing, SLN mapping and biopsies, mini laparotomy, ICG dye 6/25/19    S/P hysterectomy  Resolved Problems:    * No resolved hospital problems. *      Please page Dr. Kimani Ye with questions or concerns.      Torrie Bermeo DO  OBGYN Resident  Pgr: [phone]  6/25/2019  10:43 PM

## 2019-06-26 NOTE — PROGRESS NOTES
Gynecology/Oncology Resident Interval Note    Updated Dr. Raymon Buck on patient's urine output and labs s/p transfusion at [address] [place]. Will continue to hold NSAIDs. Repeat H&H at 1000 revealed Hgb of 9.3, Lovenox ordered as Hgb is stable. K and Calcium improved no further replacement needed.      Recent Results (from the past 4 hour(s))   Hemoglobin and Hematocrit, Blood    Collection Time: 06/26/19 11:05 AM   Result Value Ref Range    Hemoglobin 9.3 (L) 11.9 - 15.1 g/dL    Hematocrit 32.9 (L) 36.3 - 47.1 %   POC Glucose Fingerstick    Collection Time: 06/26/19 12:48 PM   Result Value Ref Range    POC Glucose 145 (H) 65 - 105 mg/dL     Dr. Raymon Buck to round this afternoon    Soni Alvares DO  OB/GYN Resident, PGY3  [address]   6/26/2019, 1:19 PM

## 2019-06-26 NOTE — PROGRESS NOTES
Gynecology Oncology Progress Note      Piyush Weeks is a 41 y.o. female , POD # 1 s/p s/p RALH, BSO, Cytologic washing, SLN mapping and biopsies utilizing ICG dye, mini laparotomy on 19    Patient seen and examined. She is sitting up in chair having just received breakfast. She reports feeling good after the 2U PRBCs received overnight. Her pain is controlled - reports stomach cramping, but that it isn't too bad. She is very determined to have her mathias removed. She had low urine output overnight. She received two 500mL NS bolus and voided 400mL at 0330 and has another 300mL of dark yellow urine in the mathias bag with more in the mathias tubing). She is tolerating PO with no N/V. She denies any vaginal bleeding. She is ambulating at bedside and denies lightheadedness or dizziness. She is  passing flatus. She denies Fever/Chills, Chest Pain, SOB, N/V, Calf Pain. Vitals:  Vitals:    19 0155 19 0200 19 0359 19 0746   BP: 110/67 125/74 124/70 120/65   Pulse: 63 71 76 71   Resp: 15 17 15 17   Temp: 98.4 °F (36.9 °C) 98.4 °F (36.9 °C) 98.2 °F (36.8 °C) 97.4 °F (36.3 °C)   TempSrc: Oral Oral Oral Oral   SpO2: 100% 99% 100% 100%   Weight:       Height:             Intake/Output:   Last Shift: I/O last 3 completed shifts: In: 6727.3 [P.O.:950; I.V.:5023.3; Blood:554; IV Piggyback:200]  Out: 730 [Urine:630; Blood:100]  Current Shift: No intake/output data recorded.     300 mL dark yellow urine in mathias bag and tubing since 0330 ~75 mL/hr for the last 4 hours    Physical Exam:  CONSTITUTIONAL: Obesity, Alert and oriented, no acute distress, tearful when asking about removing the mathias catheter  HEAD: normocephalic, atraumatic  EYES: Pupils equal and reactive to light, Extraocular muscles intact, sclera non icteric  ENT: Mucus membranes moist, No otorrhea, no rhinorrhea  NECK:  supple, symmetrical, trachea midline   LUNGS:  Good air movement bilaterally, unlabored respirations, no wheezes or rhonchi  CARDIOVASCULAR: Regular rate and rhythm, no murmurs rubs or gallops  ABDOMEN: soft, appropriately tender, non distended, no rebound or guarding, hypoactive bowel sounds  INCISIONS: Prevena in place over midline vertical incision; functioning well; incisions clean, dry and intact on six laparoscopic incisions, no serosanguinous drainage   MUSCULOSKELETAL:  No LE edema, no calf tenderness or swelling, SCDs on and running   SKIN: No abscess or rash  NEUROLOGIC:  Cranial nerves 2-12 grossly intact, no focal deficits  PSYCH: affect appropriate      Lab:  Results for orders placed or performed during the hospital encounter of 06/25/19   Hemoglobin and hematocrit, blood   Result Value Ref Range    POC Hemoglobin 10.2 (L) 12.0 - 16.0 g/dL    POC Hematocrit 30 (L) 36 - 46 %   Cytology, Non-Gyn   Result Value Ref Range    Specimen Description NOT REPORTED     Case Number: [id number]    Surgical Pathology   Result Value Ref Range    Surgical Pathology Report       [id number]  [employer] [facility] [employer]  ANATOMIC PATHOLOGY  [address] [place] [address]. [facility], [address]  [phone]  Fax: [phone]     Patient Name: Deena Kline Med Rec: [id number]  Path Number: [id number]  Collected: 6/25/2019  Received: 6/25/2019  Reported: 6/26/2019 08:17    -- Diagnosis --    PELVIC WASHINGS:  NEGATIVE FOR MALIGNANCY. LAILA Serna  **Electronically Signed Out**         rdd/6/26/2019       Clinical Information  Endometrial cancer. Source of Specimen  1: PELVIC WASHINGS    Gross Description  \"PELVIC WASHING\" 30.0 ml. colorless fluid. MICROSCOPIC DESCRIPTION     Microscopic examination performed.          Basic Metabolic Panel   Result Value Ref Range    Glucose 165 (H) 70 - 99 mg/dL    BUN 10 6 - 20 mg/dL    CREATININE 0.83 0.50 - 0.90 mg/dL    Bun/Cre Ratio NOT REPORTED 9 - 20    Calcium 8.8 8.6 - 10.4 mg/dL    Sodium 135 135 - 144 mmol/L    Potassium 4.6 3.7 - 5.3 mmol/L    Chloride 104 98 - 107 mmol/L    CO2 20 20 - 31 mmol/L    Anion Gap 11 9 - 17 mmol/L    GFR Non-African American >60 >60 mL/min    GFR African American >60 >60 mL/min    GFR Comment          GFR Staging NOT REPORTED    CBC WITH AUTO DIFFERENTIAL   Result Value Ref Range    WBC 11.3 3.5 - 11.3 k/uL    RBC 3.29 (L) 3.95 - 5.11 m/uL    Hemoglobin 6.2 (LL) 11.9 - 15.1 g/dL    Hematocrit 24.5 (L) 36.3 - 47.1 %    MCV 74.5 (L) 82.6 - 102.9 fL    MCH 18.8 (L) 25.2 - 33.5 pg    MCHC 25.3 (L) 28.4 - 34.8 g/dL    RDW 21.2 (H) 11.8 - 14.4 %    Platelets See Reflexed IPF Result 138 - 453 k/uL    MPV NOT REPORTED 8.1 - 13.5 fL    NRBC Automated 0.0 0.0 per 100 WBC    Differential Type NOT REPORTED     WBC Morphology NOT REPORTED     RBC Morphology NOT REPORTED     Platelet Estimate NOT REPORTED     Immature Granulocytes 0 0 %    Seg Neutrophils 97 (H) 36 - 66 %    Lymphocytes 2 (L) 24 - 44 %    Monocytes 1 1 - 7 %    Eosinophils % 0 (L) 1 - 4 %    Basophils 0 0 - 2 %    Absolute Immature Granulocyte 0.00 0.00 - 0.30 k/uL    Segs Absolute 10.96 (H) 1.8 - 7.7 k/uL    Absolute Lymph # 0.23 (L) 1.0 - 4.8 k/uL    Absolute Mono # 0.11 0.1 - 0.8 k/uL    Absolute Eos # 0.00 0.0 - 0.4 k/uL    Basophils # 0.00 0.0 - 0.2 k/uL    Morphology ANISOCYTOSIS PRESENT     Morphology MICROCYTOSIS PRESENT     Morphology HYPOCHROMIA PRESENT     Morphology 1+ ELLIPTOCYTES     Morphology 1+ TEARDROPS    BASIC METABOLIC PANEL   Result Value Ref Range    Glucose 170 (H) 70 - 99 mg/dL    BUN 7 6 - 20 mg/dL    CREATININE 0.60 0.50 - 0.90 mg/dL    Bun/Cre Ratio NOT REPORTED 9 - 20    Calcium 5.9 (LL) 8.6 - 10.4 mg/dL    Sodium 141 135 - 144 mmol/L    Potassium 2.9 (LL) 3.7 - 5.3 mmol/L    Chloride 116 (H) 98 - 107 mmol/L    CO2 17 (L) 20 - 31 mmol/L    Anion Gap 8 (L) 9 - 17 mmol/L    GFR Non-African American >60 >60 mL/min    GFR African American >60 >60 mL/min    GFR Comment          GFR Staging NOT diabetic, carb controlled, medium dose ISS   - Check glucose AC/HS   - IVF: IVF NS @125 ml/hr   - UCx pending   - Pathology: pending, pelvic washings negative    Anemia of acute blood loss   - Hgb 10.1>6.2   - S/p 2U PRBCs   - Hgb 9.8 this am   - Suspect patient was hemoconcentrated prior to surgery   - Hgb goal >7.0   - Plan for H&H this am at 1000   - If Hgb <7.0 will proceed with CT Abd/Pelvis    Dr. Rossana Scales updated and in agreement    Active Problems:    RALH, BSO, Cytologic washing, SLN mapping and biopsies, mini laparotomy, ICG dye 6/25/19    S/P hysterectomy  Resolved Problems:    * No resolved hospital problems. *      Please Perfect Serve or page the resident named below with questions and concerns.     Willie Pichardo DO  OBGYN Resident  Pgr: [phone]  6/26/2019 8:01 AM

## 2019-06-26 NOTE — OP NOTE
[address] [address]                                OPERATIVE REPORT    PATIENT NAME: Nakul Mccollum                   :        1980  MED REC NO:   [id number]                             ROOM:       9244  ACCOUNT NO:   [de-identified]                           ADMIT DATE: 2019  PROVIDER:     Nader Goodman MD    DATE OF PROCEDURE:  2019    PREOPERATIVE DIAGNOSES:  Endometrial adenocarcinoma, grade 1; and  greatly enlarged uterus. POSTOPERATIVE DIAGNOSES:  Endometrial adenocarcinoma, grade 1; greatly  enlarged uterus; and morbid obesity. PROCEDURES:  Robotic-laparoscopic modified radical hysterectomy,  bilateral salpingo-oophorectomy, peritoneal washings for cytology,  sentinel lymph node mapping and bilateral pelvic sentinel lymph node  biopsies, laparotomy. ANESTHESIA:  General.    SURGEON:  Huy Lambert MD    ASSISTANTS:  Elin Dowd DO; Lo Lennon PA-C    ESTIMATED BLOOD LOSS FOR ALL PROCEDURES:  100 mL. BLOOD PRODUCTS GIVEN:  None. COMPLICATIONS:  None. DRAINS:  There were no wound drains. PACKS:  There were no packs. LINES:  There was a peripheral IV of lactated Ringer's. SCDs:  There were SCDs to the lower extremities during the procedure. THOMPSON:  There was a Thompson catheter to straight drain during the  procedure. A Prevena wound VAC was placed to the laparotomy incision at the end of  the procedure to suction. BRIEF HISTORY AND INDICATION FOR OPERATION:  The patient is a  72-year-old,  3, para 1, AB 2 female, who was referred because of  a diagnosis of adenocarcinoma of the endometrium, grade 1. The patient  had morbid obesity, was 5 feet 2 inches and 301 pounds with a BMI of  52.31. She had been having regular menstrual cycles lasting 3 days with  very heavy menses up until 3 months ago.   At that point in time, she had  sudden severe bleeding and was admitted to [facility].  She  was found to be very anemic and had a hemoglobin of 4.1. She received 7  units of packed red blood cells and multiple doses of intravenous  Venofer as well as oral iron therapy. A gynecologic consultation was obtained. Hematology-oncology  consultation was also obtained. The gynecologist then performed an  endometrial biopsy that revealed adenocarcinoma, grade 1, of  endometrioid type. A transvaginal pelvic ultrasound was performed on  05/06/2019 that revealed a greatly enlarged uterus measuring 16.6 x 8.7  x 9.5 cm with a thickened endometrial stripe at 3.45 cm. The patient  was then referred to [facility], and further  workup was performed that revealed no evidence of metastatic disease. The patient was counseled regarding management options for her cancer. It was felt that she would require a laparotomy probably because of the  large size of the uterus; however, it was felt that she might be a  candidate for a minimally invasive approach and she wanted to attempt  this if possible. It was, therefore, arranged for her to have a  robotic-laparoscopic modified radical hysterectomy, bilateral  salpingo-oophorectomy, and possible laparotomy. The patient was  continued on iron therapy. Again, her workup was negative. She was  taken to the operating room on 06/25/2019. DESCRIPTION OF OPERATIVE PROCEDURE:  Under adequate general anesthesia,  the patient was placed in the supine position and prepped and draped in  the usual fashion. Pelvic examination was performed that revealed a  uterus that was nearly to the umbilicus. It was, however, mobile. No  other pathology was obtained on exam.    The patient was placed in the \"modified ski\" position, and the vaginal  portion of the procedure was performed. A large [facility] uterine  manipulator was selected. Sutures of 0 Monocryl were placed at 3  o'clock and 9 o'clock.   The ICG dye was towards  the liver on the right and spleen on the left. The tissues were peeled  towards the midline, and all the retroperitoneal structures were  identified including the common, external, and internal iliac arteries,  and their respective veins. The ureters were noted on the medial leaf  of peritoneum. Care was taken to observe these throughout the  procedure. The pararectal and paravesical spaces were then developed  bluntly. We then used the Firefly infrared camera to identify the ICG  dye in the sentinel lymph nodes. On the patient's right side, the  sentinel lymph nodes were both obturator and external iliac. On the  patient's left side, they were obturator; however, additional lymph  nodes were removed on the patient's left side that included most of the  external iliac lymph nodes. Following this, we dissected down the internal iliac arteries and  identified their various branches including the umbilical, superior  vesical, and uterine arteries. The uterine arteries were then isolated  and heavily cauterized with [place] bipolar forceps to help prevent  bleeding. With the ureters in clear site, we were able to identify the ovarian  vascular bundles, and these were isolated at the pelvic brim away from  the ureters and were heavily cauterized with the fenestrated bipolar  forceps. They were then cut. The posterior peritoneum was taken down  with monopolar scissors with the ureter in clear site. These were taken  all the way down to the uterosacral ligaments. Again, additional time was necessary because of the extreme difficulty  in maneuvering the very large uterus. There was minimal bleeding,  however. The urine remained clear. We then were able to cauterize the round ligaments near the pelvic  sidewall, and these were then cut. The anterior leaves of the broad  ligament were taken down with the monopolar scissors.     Using the fenestrated bipolar forceps, we lifted up the bladder flap  peritoneum, and we were able to undermine this and takedown the  vesicouterine peritoneum at this point. We then dissected the bladder  off of the anterior cervix which was also quite large. This was done  primarily with sharp dissection. It was taken down onto the anterior  vagina. The uterine blood vessels and the cardinal ligaments were quite  large, and it was necessary to cauterize very heavily with the  fenestrated bipolar instrumentation. These were then cut. Using the  [facility] as a guide, we were able to identify the anterior vagina, and this  was opened with the monopolar scissors. We opened posteriorly at 6  o'clock with the monopolar scissors. We were then able to cauterize and  cut the uterosacral ligaments near the vagina with the ureters in clear  site. After pushing the ureters further out of harm's way, we were able  to use the [facility] as a guide and cut around the cup with monopolar  scissors, and the specimen was then freed. There was minimal bleeding. At this point, the cervix and uterus was attached to the [facility] by  sutures, and an attempt was made to deliver the uterus, tubes, and  ovaries in an en bloc fashion through the vagina. However, the uterus  was far too large for this. After about 20 minutes, decision was made  to place the entire uterine specimen, ovaries, tubes, etc., into a 15-cm  bag, and the bag was then closed. The string was then fed up through  the assistant port trocar on the right side and pulled upward in order  to prevent any spillage of tumor. We then decided to perform a  mini-laparotomy from the umbilicus in the midline downward. The patient  was extremely obese with adipose tissue probably measuring 14 cm in most  areas. We used the Bovie cautery to takedown the adipose tissue. The  incision that was made was approximately 12 cm in length, but on  stretch, was probably 16 cm. The fascia was then cut with a cutting  cautery.   The rectus muscles were split in the midline. The peritoneum  was held forward and was entered. We then transferred the string from  the bag to the abdominal incision, and the specimen was delivered. It  was extremely large. It was submitted for permanent analysis only. We then closed the peritoneum with a running continuous suture of 2-0  Monocryl. We closed the fascia with #1 PDS; 2 links were used, one  above and one below. These were tied together in the midline. This was  a running suture. We placed interrupted far-near/near-far sutures of #1  Prolene as internal retention sutures at 5-cm intervals. Subcutaneous  tissue again was quite deep. It was heavily lavaged with Betadine and  saline solution. Hemostasis was obtained. We then reapproximated the  subcutaneous tissue using 2-0 plain, and staples were used for the skin. A Prevena wound VAC was then placed. We lavaged the robotic trocar  incisions with Betadine and saline solution as well, and these were  closed with 4-0 Monocryl in a subcuticular fashion. Dermabond was then  used on the skin. The patient tolerated the surgery and anesthesia well and was then taken  to the recovery room in satisfactory condition. Again, the operative  blood loss was approximately 100 mL, and no blood products were given.         Rosalino Cortes MD    D: 06/25/2019 14:00:44       T: 06/25/2019 15:21:34     [email or web address]  Job#: [id number]     Doc#: [id number]    CC:

## 2019-06-26 NOTE — CARE COORDINATION
Discharge [address] [facility], [place] Case Management Department  Written by: Anita Jefferson RN    Patient Name: Luis Antonio Snider  Attending Provider: Kendrick Boss MD  Admit Date: 2019  5:20 AM  MRN: [id number]  Account: [de-identified]                     : 1980  Discharge Date:  19      Disposition: home    Anita Jefferson RN

## 2019-06-26 NOTE — PROGRESS NOTES
Patient discharged with family member and all belongings. Discharge paperwork provided and discussed. All questions answered. Patient education provided on administration of Lovenox. Patient verbalized understanding. Two IV's removed with no complications and catheter intact. Patient left unit via wheelchair.

## 2019-06-26 NOTE — PROGRESS NOTES
CLINICAL PHARMACY NOTE: MEDS TO [address] Select Patient?: No  Total # of Prescriptions Filled: 2   The following medications were delivered to the patient:  · LOVENOX  · Ana Cristina Jose L  Total # of Interventions Completed: 0  Time Spent (min): 0    Additional Documentation:    CATALINA

## 2019-06-26 NOTE — PROGRESS NOTES
Gynecology/Oncology Resident Interval Note    Have been keeping track of patient's urine output, which has been low since surgery. RN reports UO of 50mL after administration of two 500mL NS bolus. She has now received two NS bolus with minimal increase in UO. CBC and BMP completed and Hgb noted to be 6.2. H&H completed preoperatively this morning prior to surgery was 10.2 (but was 8.7 on 6/11/19). Kidney function wnl with Cr of 0.60. K noted to be low at 2.9 and Ca also low at 5.9. Patient's vitals stable, patient not hypotensive or tachycardic. RN reports no active bleeding. Vitals:    06/25/19 1500 06/25/19 1515 06/25/19 1600 06/25/19 2133   BP: 122/63 130/71 123/73 124/82   Pulse: 79 85 94 86   Resp: 16 15 16 18   Temp:  96.8 °F (36 °C) 98 °F (36.7 °C) 98.7 °F (37.1 °C)   TempSrc:   Oral Oral   SpO2: 98% 99% 92% 98%   Weight:       Height:             K replacement protocol ordered. Will follow up Ca level with ionized calcium level. Will hold Lovenox and NSAIDs at this time. Will plan to keep Hgb above 7.0. Will check H&H every 6hrs. If Hgb is <7.0 on subsequent H&H, will obtain CT Abd/pelvis at that time. Discussed above with RN via [address] [place] [address] and via phone. Discussed with Dr. Junior Spann who is in agreement with plan.     Donaldo Sanchez DO  OB/GYN Resident, PGY3  [facility]  6/25/2019, 10:11 PM

## 2019-06-26 NOTE — PLAN OF CARE
Problem: Falls - Risk of:  Goal: Will remain free from falls  Description  Will remain free from falls  6/26/2019 1857 by Candance Foreman, RN  Outcome: Met This Shift  6/26/2019 0514 by Peyton Solis RN  Outcome: Met This Shift  Goal: Absence of physical injury  Description  Absence of physical injury  6/26/2019 1857 by Candance Foreman, RN  Outcome: Met This Shift  6/26/2019 0514 by Peyton Solis RN  Outcome: Met This Shift     Problem: Pain:  Goal: Pain level will decrease  Description  Pain level will decrease  6/26/2019 1857 by Candance Foreman, RN  Outcome: Met This Shift  6/26/2019 0514 by Peyton Solis RN  Outcome: Ongoing  Goal: Control of acute pain  Description  Control of acute pain  6/26/2019 1857 by Candance Foreman, RN  Outcome: Met This Shift  6/26/2019 0514 by Peyton Solis RN  Outcome: Ongoing  Goal: Control of chronic pain  Description  Control of chronic pain  6/26/2019 1857 by Candance Foreman, RN  Outcome: Met This Shift  6/26/2019 0514 by Peyton Solis RN  Outcome: Ongoing

## 2019-06-27 ENCOUNTER — TELEPHONE (OUTPATIENT)
Dept: GYNECOLOGIC ONCOLOGY | Age: 39
End: 2019-06-27

## 2019-06-27 LAB
ABO/RH: NORMAL
ANTIBODY SCREEN: NEGATIVE
ARM BAND NUMBER: NORMAL
BLD PROD TYP BPU: NORMAL
BLD PROD TYP BPU: NORMAL
CROSSMATCH RESULT: NORMAL
CROSSMATCH RESULT: NORMAL
DISPENSE STATUS BLOOD BANK: NORMAL
DISPENSE STATUS BLOOD BANK: NORMAL
EXPIRATION DATE: NORMAL
SURGICAL PATHOLOGY REPORT: NORMAL
TRANSFUSION STATUS: NORMAL
TRANSFUSION STATUS: NORMAL
UNIT DIVISION: 0
UNIT DIVISION: 0
UNIT NUMBER: NORMAL
UNIT NUMBER: NORMAL

## 2019-06-27 NOTE — TELEPHONE ENCOUNTER
Pt returned call to office. She states she is \"pretty sore\" and is alternating with ibuprofen and acetaminophen. She states she had a bowel movement today that was formed and without straining. Reminded patient of repeat lab work tomorrow, she is agreeable. Pt states wound vac is \"not causing her any issues\". She notes no drainage to the vac. She denies vaginal bleeding. She is ambulating without concern.

## 2019-06-27 NOTE — TELEPHONE ENCOUNTER
Left vm for patient to return call to office. Would like to speak with her to see how she is doing after discharge. Pt is also expected to repeat labwork (H&H) tomorrow 6/28, would like to confirm pt will have this done. Follow up call to pt grandmother Kennedy, states patient is probably resting at home. She last spoke with pt yesterday evening and pt was \"sore but resting at home\".

## 2019-06-28 ENCOUNTER — TELEPHONE (OUTPATIENT)
Dept: GYNECOLOGIC ONCOLOGY | Age: 39
End: 2019-06-28

## 2019-06-28 NOTE — TELEPHONE ENCOUNTER
Left voicemail to remind patient of necessary laboratory work H&H that we would like to have completed today. Pt was also called yesterday 6/27/19 for the reminder and she was understanding. Will await lab results.

## 2019-07-01 ENCOUNTER — TELEPHONE (OUTPATIENT)
Dept: GYNECOLOGIC ONCOLOGY | Age: 39
End: 2019-07-01

## 2019-07-02 ENCOUNTER — OFFICE VISIT (OUTPATIENT)
Dept: GYNECOLOGIC ONCOLOGY | Age: 39
End: 2019-07-02

## 2019-07-02 ENCOUNTER — HOSPITAL ENCOUNTER (OUTPATIENT)
Age: 39
Discharge: HOME | End: 2019-07-02

## 2019-07-02 VITALS
TEMPERATURE: 97.9 F | HEART RATE: 84 BPM | SYSTOLIC BLOOD PRESSURE: 132 MMHG | DIASTOLIC BLOOD PRESSURE: 91 MMHG | BODY MASS INDEX: 53.44 KG/M2 | WEIGHT: 292.2 LBS

## 2019-07-02 DIAGNOSIS — C55 ENDOMETRIOID ADENOCARCINOMA OF UTERUS (HCC): Primary | ICD-10-CM

## 2019-07-02 DIAGNOSIS — D50.0 IRON DEFICIENCY ANEMIA DUE TO CHRONIC BLOOD LOSS: ICD-10-CM

## 2019-07-02 DIAGNOSIS — Z90.710 S/P LAPAROSCOPIC HYSTERECTOMY: ICD-10-CM

## 2019-07-02 LAB
HCT VFR BLD CALC: 36.2 % (ref 36.3–47.1)
HEMOGLOBIN: 9.9 G/DL (ref 11.9–15.1)

## 2019-07-02 PROCEDURE — 85014 HEMATOCRIT: CPT

## 2019-07-02 PROCEDURE — 36415 COLL VENOUS BLD VENIPUNCTURE: CPT

## 2019-07-02 PROCEDURE — 99024 POSTOP FOLLOW-UP VISIT: CPT | Performed by: PHYSICIAN ASSISTANT

## 2019-07-02 PROCEDURE — 85018 HEMOGLOBIN: CPT

## 2019-07-02 NOTE — PROGRESS NOTES
[address] [facility], [facility], [address] Dedrick Claude is a 41 y.o. female who presents with her grandmother for a Post Operative visit today     CC/HPI: She presents following her surgery of robotic laparoscopic hysterectomy, bilateral salpingo-oophorectomy, cytologic washings, sentinel lymph node mapping and biopsies, with a lapartomy on 6/25/19 for endometrial adenocarcinoma confirmed on endometrial biopsy sampling by Dr. Meliton Gold. The final pathology revealed \"Tumor is confined to the uterine corpus and invades into   the myometrial outer half, but not to the serosal surface. Cervix and endocervix, mild chronic inflammation. Bilateral fallopian tubes and ovaries, no histologic abnormality. Margins, free of tumor\" Bilateral fallopian tubes and ovaries are negative for metastatic carcinoma. Pelvic washings were negative for malignancy. No LVSI is absent. The tumor size was measured to 11.5 x 5.8 x 3.2 cm. Her post operative stay was complicated by post operative anemia requiring 2 units of PRBC. The patient was then hemodynamically stabilized and discharged home on post op day #1. The patient was instructed to have follow up H&H drawn Friday 6/28/19, however was unable to find transportation. Repeated H & H prior to today's visit 7/2/19 was stable with Hbg of 9.9 and Hct of 36.2. Patient is anemic at baseline. She is to restart oral iron supplement today. Today, she states she is feeling well. She has been ambulating without concerns. She mentions her pain is well controlled with alternating tylenol and Ibuprofen which she states she mainly uses in the evening to sleep. She is urinating without concerns. She is having a daily bowel movement without straining. No blood in urine or stool. Her appetite is stable. She continues to follow activity restrictions.        ROS:  I have personally reviewed and agree with the review of systems done

## 2019-07-10 ENCOUNTER — TELEPHONE (OUTPATIENT)
Dept: INTERNAL MEDICINE | Age: 39
End: 2019-07-10

## 2019-07-10 DIAGNOSIS — C55 ENDOMETRIOID ADENOCARCINOMA OF UTERUS (HCC): Primary | ICD-10-CM

## 2019-07-12 ENCOUNTER — TELEPHONE (OUTPATIENT)
Dept: RADIATION ONCOLOGY | Age: 39
End: 2019-07-12

## 2019-07-18 ENCOUNTER — TELEPHONE (OUTPATIENT)
Dept: GYNECOLOGIC ONCOLOGY | Age: 39
End: 2019-07-18

## 2019-07-24 ENCOUNTER — TELEPHONE (OUTPATIENT)
Dept: ONCOLOGY | Age: 39
End: 2019-07-24

## 2019-07-25 ENCOUNTER — TELEPHONE (OUTPATIENT)
Dept: ONCOLOGY | Age: 39
End: 2019-07-25

## 2019-07-31 ENCOUNTER — TELEPHONE (OUTPATIENT)
Dept: ONCOLOGY | Age: 39
End: 2019-07-31

## 2019-07-31 ENCOUNTER — HOSPITAL ENCOUNTER (OUTPATIENT)
Dept: RADIATION ONCOLOGY | Age: 39
Discharge: HOME | End: 2019-07-31

## 2019-07-31 VITALS
TEMPERATURE: 98.1 F | DIASTOLIC BLOOD PRESSURE: 80 MMHG | HEIGHT: 62 IN | HEART RATE: 64 BPM | OXYGEN SATURATION: 98 % | RESPIRATION RATE: 14 BRPM | SYSTOLIC BLOOD PRESSURE: 117 MMHG | WEIGHT: 285 LBS | BODY MASS INDEX: 52.44 KG/M2

## 2019-07-31 DIAGNOSIS — C54.1 ENDOMETRIAL CANCER, GRADE I (HCC): Primary | ICD-10-CM

## 2019-07-31 PROCEDURE — 99213 OFFICE O/P EST LOW 20 MIN: CPT | Performed by: RADIOLOGY

## 2019-07-31 NOTE — PROGRESS NOTES
Referring Physician:Dr Hernandez Mas:    19 0748   BP: 117/80   Pulse: 64   Resp: 14   Temp: 98.1 °F (36.7 °C)   SpO2: 98%    :  Patient Currently in Pain: Denies             Wt Readings from Last 1 Encounters:   19 285 lb (129.3 kg)        Body mass index is 52.13 kg/m². Height: 5' 2\" (157.5 cm)         Immunizations:    Influenza status:    []   Current   [x]   Patient declined    Pneumococcal status:  []   Current  [x]   Patient declined    Smoking Status:    [] Smoker - PPD:   [] Nonsmoker - Quit Date:               [x] Never a smoker             LMP: 2019    Age at first Menses: 14    Para: 2    : 1            No chief complaint on file. Cancer Staging  Endometrial cancer, grade I ([facility] [address]  Staging form: Corpus Uteri - Carcinoma And Carcinosarcoma, AJCC 8th Edition  - Clinical stage from 2019: FIGO Stage IB (cT1b, cN0(sn), cM0) - Signed by Ulisses Dinero MD on 2019  - Pathologic stage from 2019: FIGO Stage IB (pT1b, pN0(sn), cM0) - Signed by Ulisses Dinero MD on 2019      Prior Radiation Therapy? No   If yes, site treated:   Facility:                             Date:    Concurrent Chemo/radiation? No   If yes, start date:    Prior Chemotherapy? No   If yes    Facility:                             Date:    Prior Hormonal Therapy? No   If yes   Facility:                             Date:    Head and Neck Cancer? No   If yes, please remind physician to place swallow study order and speech therapy order.       Pacemaker/Defibulator/ICD:  No              Current Outpatient Medications   Medication Sig Dispense Refill    ondansetron (ZOFRAN-ODT) 4 MG disintegrating tablet Take 1 tablet by mouth every 8 hours as needed for Nausea or Vomiting 20 tablet 0    docusate sodium (COLACE) 100 MG capsule Take 1 capsule by mouth daily as needed for Constipation 60 capsule 0    ferrous sulfate 325 (65 Fe) MG EC tablet Take 1 tablet by mouth 3 times daily (with meals) 90 tablet 5     No current facility-administered medications for this encounter.         Past Medical History:   Diagnosis Date    Abnormal uterine bleeding 05/05/2019    7 units of bloood, admiitted    Anemia     Endometrioid adenocarcinoma of uterus ([facility] [address] 05/23/2019    H/O transfusion May 5-8 2019    7 units     Obesity     Pre-diabetes     no RX currently, did not tolerate metformin    Snores     Tooth discoloration     left upper front tooth- has a hyperpigmented crack - from childhood    Wears contact lenses        Past Surgical History:   Procedure Laterality Date    DILATION AND CURETTAGE OF UTERUS  1997, 1999    x2    HYSTERECTOMY  06/25/2019    ROBOTIC LAPAROSCOPIC HYSTERECTOMY, BSO , CYTOLOGIC WASHING, SENTINEL LYMPH NODE MAPPING AND BIOPSIES, LAPAOROTOMY    HYSTERECTOMY ABDOMINAL N/A 6/25/2019    XI ROBOTIC LAPAROSCOPIC HYSTERECTOMY, BSO , CYTOLOGIC WASHING, SENTINEL LYMPH NODE MAPPING AND BIOPSIES, LAPAOROTOMY, ICG DYE performed by Nedra Price MD at [place] History   Problem Relation Age of Onset    Cancer Maternal Grandmother         skin    No Known Problems Father     Heart Disease Maternal Grandfather     Diabetes Maternal Grandfather     Colon Cancer Maternal Grandfather     No Known Problems Paternal Grandmother     No Known Problems Paternal Grandfather        Social History     Socioeconomic History    Marital status: Single     Spouse name: Not on file    Number of children: 1    Years of education: Not on file    Highest education level: Not on file   Occupational History    Not on file   Social Needs    Financial resource strain: Not on file    Food insecurity:     Worry: Not on file     Inability: Not on file    Transportation needs:     Medical: Not on file     Non-medical: Not on file   Tobacco Use    Smoking status: Never Smoker    Smokeless tobacco: Never Used   Substance and Sexual Activity    Alcohol use: Not Currently     Frequency: Monthly

## 2019-07-31 NOTE — CONSULTS
CYTOLOGIC WASHING, SENTINEL LYMPH NODE MAPPING AND BIOPSIES, LAPAOROTOMY, ICG DYE performed by Consuelo Chaney MD at [place] [address]         Current Outpatient Medications:     ondansetron (ZOFRAN-ODT) 4 MG disintegrating tablet, Take 1 tablet by mouth every 8 hours as needed for Nausea or Vomiting, Disp: 20 tablet, Rfl: 0    docusate sodium (COLACE) 100 MG capsule, Take 1 capsule by mouth daily as needed for Constipation, Disp: 60 capsule, Rfl: 0    ferrous sulfate 325 (65 Fe) MG EC tablet, Take 1 tablet by mouth 3 times daily (with meals), Disp: 90 tablet, Rfl: 5    ALLERGIES:   Allergies   Allergen Reactions    Metformin And Related Other (See Comments)     Abdominal cramps       Social History     Socioeconomic History    Marital status: Single     Spouse name: None    Number of children: 1    Years of education: None    Highest education level: None   Occupational History    None   Social Needs    Financial resource strain: None    Food insecurity:     Worry: None     Inability: None    Transportation needs:     Medical: None     Non-medical: None   Tobacco Use    Smoking status: Never Smoker    Smokeless tobacco: Never Used   Substance and Sexual Activity    Alcohol use: Not Currently     Frequency: Monthly or less     Comment: occasional    Drug use: No    Sexual activity: Not Currently   Lifestyle    Physical activity:     Days per week: None     Minutes per session: None    Stress: None   Relationships    Social connections:     Talks on phone: None     Gets together: None     Attends Congregation service: None     Active member of club or organization: None     Attends meetings of clubs or organizations: None     Relationship status: None    Intimate partner violence:     Fear of current or ex partner: None     Emotionally abused: None     Physically abused: None     Forced sexual activity: None   Other Topics Concern    None   Social History Narrative    None       Family History   Problem examined. ASSESSMENT AND PLAN:   Willian Clemente is a 41 y.o. female with a Cancer Staging  Endometrial cancer, grade I ([facility] [address]  Staging form: Corpus Uteri - Carcinoma And Carcinosarcoma, AJCC 8th Edition  - Clinical stage from 6/27/2019: FIGO Stage IB (cT1b, cN0(sn), cM0) - Signed by Nedra Price MD on 6/27/2019  - Pathologic stage from 6/27/2019: FIGO Stage IB (pT1b, pN0(sn), cM0) - Signed by Nedra Price MD on 6/27/2019  The patient is a 41-year-old female with a diagnosis of an adenocarcinoma of the endometrium status post JASON/BSO for a FIGO Stage 1B grade 1. The patient presented with every menstrual periods presented herself to [facility] ER. A endometrial biopsy on 5/5/2019 confirmed a grade 1 adenocarcinoma. The patient proceeded with a JASON/BSO on 6/25/2019 with the pathological stage as above. She was seen by GYN oncology on 7/2/2019 with a follow-up planned for 8/20/2019. I do agree that the patient would be an appropriate candidate for vagina brachytherapy given when the high risk features including an 11 cm tumor and greater than 50% myometrial invasion. I did review with the patient the logistics, expected outcome possible side effects of radiation therapy and addressed all of her questions to her apparent satisfaction. An informed consent was obtained. The patient will be scheduled for a CT simulation after her follow-up appointment with Dr. Madalyn Brady and nancy to proceed with treatment. Electronically signed by Liberty Munguia MD on 7/31/2019 at 9:03 AM  [id number]:  Patient Care Team:  Pedro Pablo Strickland MD as PCP - General (Internal Medicine)  Shravan Burks RN as Nurse Navigator (Oncology)     Drugs Prescribed:  New Prescriptions    No medications on file       Orders Placed:   No orders of the defined types were placed in this encounter.

## 2019-08-22 ENCOUNTER — OFFICE VISIT (OUTPATIENT)
Dept: GYNECOLOGIC ONCOLOGY | Age: 39
End: 2019-08-22

## 2019-08-22 VITALS
TEMPERATURE: 97.4 F | HEIGHT: 62 IN | BODY MASS INDEX: 51.05 KG/M2 | DIASTOLIC BLOOD PRESSURE: 82 MMHG | HEART RATE: 84 BPM | WEIGHT: 277.4 LBS | SYSTOLIC BLOOD PRESSURE: 138 MMHG

## 2019-08-22 DIAGNOSIS — C55 ENDOMETRIOID ADENOCARCINOMA OF UTERUS (HCC): Primary | ICD-10-CM

## 2019-08-22 DIAGNOSIS — Z90.710 S/P LAPAROSCOPIC HYSTERECTOMY: ICD-10-CM

## 2019-08-22 PROCEDURE — 99024 POSTOP FOLLOW-UP VISIT: CPT | Performed by: PHYSICIAN ASSISTANT

## 2019-08-22 ASSESSMENT — ENCOUNTER SYMPTOMS
EYES NEGATIVE: 1
RESPIRATORY NEGATIVE: 1
GASTROINTESTINAL NEGATIVE: 1

## 2019-08-22 NOTE — PROGRESS NOTES
Oral    Weight: 277 lb 6.4 oz (125.8 kg)    Height: 5' 2\" (1.575 m)        Physical Exam:  General: well-appearing, no acute distress, A&O x3    Abdomen: Multiple laparoscopic incision scars are present across the abdomen. There are no rashes. Mini laparotomy midline incision scar is present with no swelling, erythema or skin changes present. There is no separation noted. No signs of infection. No herniations throughout incision sites. Pelvic exam using bariatric speculum reveals no blood or discharge in the vaginal canal. The vaginal canal is long. The vaginal cuff is set very high in the vaginal vault, however the cuff is able to be identified. There is no separation present. No granulation noted. Bimanual examination reveals a palpable suture present to the left apex of the vaginal cuff. There is mild swelling and induration to the central aspect of the vaginal cuff. However, there are no areas of separation noted. Assessment:  Cancer Staging  Endometrial cancer, grade I ([facility] [address]  Staging form: Corpus Uteri - Carcinoma And Carcinosarcoma, AJCC 8th Edition  - Clinical stage from 6/27/2019: FIGO Stage IB (cT1b, cN0(sn), cM0) - Signed by Lavonne Terry MD on 6/27/2019  - Pathologic stage from 6/27/2019: FIGO Stage IB (pT1b, pN0(sn), cM0) - Signed by Lavonne Terry MD on 6/27/2019      Post Operative Changes:  Clinically stable, doing well     Again, we dicussed the pathology results with patient, and all questions were answered. Plan:  NCCN guidelines:  Given the final pathology she is pathologically FIGO stage IB grade 1 endometrioid adenocarcinoma, specifically more than 50% myometrial invasion and grossly large tumor size, vaginal brachytherapy is recommended. Referrals have been placed after pt's first post operative visit to radiation oncology and she has established care. She is due for radiation teach next week 8/23/19.  Pt is cleared from surgical standpoint to begin HDR therapy at her proposed

## 2019-08-22 NOTE — RESEARCH
Met with patient  in exam room. Patient states    spoke to her about the clinical trial: [facility] Prevention and Treatment of Endometrial Cancer (OPTEC) Initiative: Tumor Sequencing for DNA Mismatch Repair Deficiency and Personalized Cancer Treatment. Reviewed and discussed informed consent and voluntary participation in research study with the patient including benefits and risks associated with participation. The optional consent for [place] Prevention and Treatment of Endometrial Cancer (OPTEC) Initiative: Repository for Future Research was reviewed with patient. Patient  agreed to participation in the main study and the optional repository. Consents signed after all questions were answered. Copy of signed consents given to the patient. Patient data sheet completed by nurse with subject. Patient also signed an authorization for release of medical information for OPTEC trial.   Study blood specimen was drawn. Blood  stored for shipping. She was alerted of the need to complete a questionnaire at home via internet or telephone if participating in the repository. Encouraged her to call with any questions.  Electronically signed by Jaycee Gómez RN on 8/22/2019 at 12:18 PM

## 2019-08-28 ENCOUNTER — HOSPITAL ENCOUNTER (OUTPATIENT)
Dept: RADIATION ONCOLOGY | Age: 39
Discharge: HOME | End: 2019-08-28
Attending: RADIOLOGY

## 2019-08-28 ENCOUNTER — TELEPHONE (OUTPATIENT)
Dept: ONCOLOGY | Age: 39
End: 2019-08-28

## 2019-08-28 PROCEDURE — 77290 THER RAD SIMULAJ FIELD CPLX: CPT | Performed by: RADIOLOGY

## 2019-08-28 NOTE — TELEPHONE ENCOUNTER
Name: Willian Clemente  : 1980  MRN: [id number]    Oncology Navigation Follow-Up Note    Contact Type:  Radiation Oncology  Notes: Pt @ [facility] for RO teach/sim. Met with pt prior to simulation. Pt stated receiving [id number] [facility] [employer] financial assistance via gas gift cards. Pt stated has not utilized [employer] @ this time. Pt stated awaits decision on [place] [address] financial assistance applications & Medicaid application. Instructed pt writer will update [facility] . Spoke with [facility], updated on conversation with pt. Will continue to follow.     Electronically signed by Shravan Burks RN on 2019 at 10:29 AM

## 2019-09-03 ENCOUNTER — APPOINTMENT (OUTPATIENT)
Dept: RADIATION ONCOLOGY | Age: 39
End: 2019-09-03
Attending: RADIOLOGY

## 2019-09-05 ENCOUNTER — HOSPITAL ENCOUNTER (OUTPATIENT)
Dept: RADIATION ONCOLOGY | Age: 39
Discharge: HOME | End: 2019-09-05
Attending: RADIOLOGY

## 2019-09-05 ENCOUNTER — TELEPHONE (OUTPATIENT)
Dept: ONCOLOGY | Age: 39
End: 2019-09-05

## 2019-09-05 PROCEDURE — 77332 RADIATION TREATMENT AID(S): CPT | Performed by: RADIOLOGY

## 2019-09-05 PROCEDURE — 77300 RADIATION THERAPY DOSE PLAN: CPT | Performed by: RADIOLOGY

## 2019-09-05 PROCEDURE — 77770 HDR RDNCL NTRSTL/ICAV BRCHTX: CPT | Performed by: RADIOLOGY

## 2019-09-05 PROCEDURE — 77295 3-D RADIOTHERAPY PLAN: CPT | Performed by: RADIOLOGY

## 2019-09-05 PROCEDURE — 57156 INS VAG BRACHYTX DEVICE: CPT | Performed by: RADIOLOGY

## 2019-09-05 PROCEDURE — C1717 BRACHYTX, NON-STR,HDR IR-192: HCPCS | Performed by: RADIOLOGY

## 2019-09-10 ENCOUNTER — HOSPITAL ENCOUNTER (OUTPATIENT)
Dept: RADIATION ONCOLOGY | Age: 39
Discharge: HOME | End: 2019-09-10
Attending: RADIOLOGY

## 2019-09-10 PROCEDURE — 77770 HDR RDNCL NTRSTL/ICAV BRCHTX: CPT | Performed by: RADIOLOGY

## 2019-09-10 PROCEDURE — 77300 RADIATION THERAPY DOSE PLAN: CPT | Performed by: RADIOLOGY

## 2019-09-10 PROCEDURE — 57156 INS VAG BRACHYTX DEVICE: CPT | Performed by: RADIOLOGY

## 2019-09-10 PROCEDURE — C1717 BRACHYTX, NON-STR,HDR IR-192: HCPCS | Performed by: RADIOLOGY

## 2019-09-12 ENCOUNTER — HOSPITAL ENCOUNTER (OUTPATIENT)
Dept: RADIATION ONCOLOGY | Age: 39
Discharge: HOME | End: 2019-09-12
Attending: RADIOLOGY

## 2019-09-12 PROCEDURE — C1717 BRACHYTX, NON-STR,HDR IR-192: HCPCS | Performed by: RADIOLOGY

## 2019-09-12 PROCEDURE — 57156 INS VAG BRACHYTX DEVICE: CPT | Performed by: RADIOLOGY

## 2019-09-12 PROCEDURE — 77770 HDR RDNCL NTRSTL/ICAV BRCHTX: CPT | Performed by: RADIOLOGY

## 2019-09-12 PROCEDURE — 77300 RADIATION THERAPY DOSE PLAN: CPT | Performed by: RADIOLOGY

## 2019-09-16 ENCOUNTER — HOSPITAL ENCOUNTER (OUTPATIENT)
Dept: RADIATION ONCOLOGY | Age: 39
Discharge: HOME | End: 2019-09-16
Attending: RADIOLOGY

## 2019-09-16 VITALS
WEIGHT: 280.2 LBS | TEMPERATURE: 97.7 F | DIASTOLIC BLOOD PRESSURE: 81 MMHG | HEART RATE: 67 BPM | BODY MASS INDEX: 51.25 KG/M2 | RESPIRATION RATE: 14 BRPM | SYSTOLIC BLOOD PRESSURE: 123 MMHG

## 2019-09-16 DIAGNOSIS — C54.1 ENDOMETRIAL CANCER, GRADE I (HCC): Primary | ICD-10-CM

## 2019-09-16 PROCEDURE — 77770 HDR RDNCL NTRSTL/ICAV BRCHTX: CPT | Performed by: RADIOLOGY

## 2019-09-16 PROCEDURE — 77300 RADIATION THERAPY DOSE PLAN: CPT | Performed by: RADIOLOGY

## 2019-09-16 PROCEDURE — C1717 BRACHYTX, NON-STR,HDR IR-192: HCPCS | Performed by: RADIOLOGY

## 2019-09-16 PROCEDURE — 57156 INS VAG BRACHYTX DEVICE: CPT | Performed by: RADIOLOGY

## 2019-09-16 NOTE — PROGRESS NOTES
Tanesha Garcia M.D. Rowan Rodrigues. Galina Oconnor, Ph.D., M.D., Willy Caballero M.D. Doyle Daniel, Ph.D., M.D. Olive Goodwin M.D. Date of Service: 2019    Location:  [address] [place] [address]   [phone]     RADIATION ONCOLOGY WEEKLY PROGRESS NOTE    Patient ID:   Suleiman Baez  : 1980   MRN: [id number]    Diagnosis:  Cancer Staging  Endometrial cancer, grade I ([facility] [address]  Staging form: Corpus Uteri - Carcinoma And Carcinosarcoma, AJCC 8th Edition  - Clinical stage from 2019: FIGO Stage IB (cT1b, cN0(sn), cM0) - Signed by Rian Schwab, MD on 2019  - Pathologic stage from 2019: FIGO Stage IB (pT1b, pN0(sn), cM0) - Signed by Rian Schwab, MD on 2019      Radiation Treatment Information:   Actual Dose: 2400 cGy  Total Planned Dose: 3000 cGy  Treatment Site: Vagina cuff    IMAGING:   HDR - No imaging required    CHEMOTHERAPY UPDATE:   [No treatment plan]      SUBJECTIVE: Suleiman Baez  continues to tolerate radiation therapy well. The patient has no abdominal or pelvic pains. She denies any nausea, vomiting, constipation or diarrhea. She has no dysuria or hematuria.     OBJECTIVE:     Labs:  WBC   Date Value Ref Range Status   2019 11.3 3.5 - 11.3 k/uL Final     Segs Absolute   Date Value Ref Range Status   2019 10.96 (H) 1.8 - 7.7 k/uL Final     Hemoglobin   Date Value Ref Range Status   2019 9.9 (L) 11.9 - 15.1 g/dL Final     Platelets   Date Value Ref Range Status   2019 See Reflexed IPF Result 138 - 453 k/uL Final        Date Value Ref Range Status   2019 42 (H) <38 U/mL Final     Medications:    Current Outpatient Medications:     ondansetron (ZOFRAN-ODT) 4 MG disintegrating tablet, Take 1 tablet by mouth every 8 hours as needed for Nausea or Vomiting, Disp: 20 tablet, Rfl: 0    docusate sodium (COLACE) 100 MG capsule, Take 1 capsule by mouth daily as needed for

## 2019-09-18 ENCOUNTER — HOSPITAL ENCOUNTER (OUTPATIENT)
Dept: RADIATION ONCOLOGY | Age: 39
Discharge: HOME | End: 2019-09-18
Attending: RADIOLOGY

## 2019-09-18 PROCEDURE — 77300 RADIATION THERAPY DOSE PLAN: CPT | Performed by: RADIOLOGY

## 2019-09-18 PROCEDURE — 77770 HDR RDNCL NTRSTL/ICAV BRCHTX: CPT | Performed by: RADIOLOGY

## 2019-09-18 PROCEDURE — C1717 BRACHYTX, NON-STR,HDR IR-192: HCPCS | Performed by: RADIOLOGY

## 2019-09-18 PROCEDURE — 57156 INS VAG BRACHYTX DEVICE: CPT | Performed by: RADIOLOGY

## 2019-09-22 PROCEDURE — 77336 RADIATION PHYSICS CONSULT: CPT | Performed by: RADIOLOGY

## 2019-09-25 ENCOUNTER — TELEPHONE (OUTPATIENT)
Dept: ONCOLOGY | Age: 39
End: 2019-09-25

## 2019-09-25 NOTE — TELEPHONE ENCOUNTER
PER REQUEST OF MALIHA ULLOA RN NAVIGATOR, I CALLED TO CHECK ON PATIENT AND SEE IF SHE HAS ANY ISSUES THAT WE CAN ASSIST WITH. I HAD TO LEAVE A MESSAGE FOR PATIENT AND LEFT MALIHA'S NUMBER FOR CALL BACK.

## 2019-11-06 ENCOUNTER — TELEPHONE (OUTPATIENT)
Dept: GYNECOLOGIC ONCOLOGY | Age: 39
End: 2019-11-06

## 2019-11-06 DIAGNOSIS — C55 ENDOMETRIOID ADENOCARCINOMA OF UTERUS (HCC): Primary | ICD-10-CM

## 2019-11-18 ENCOUNTER — TELEPHONE (OUTPATIENT)
Dept: GYNECOLOGIC ONCOLOGY | Age: 39
End: 2019-11-18

## 2019-12-06 ENCOUNTER — TELEPHONE (OUTPATIENT)
Dept: GYNECOLOGIC ONCOLOGY | Age: 39
End: 2019-12-06

## 2019-12-27 ENCOUNTER — TELEPHONE (OUTPATIENT)
Dept: ONCOLOGY | Age: 39
End: 2019-12-27

## 2020-01-16 ENCOUNTER — TELEPHONE (OUTPATIENT)
Dept: ONCOLOGY | Age: 40
End: 2020-01-16

## 2020-01-20 ENCOUNTER — TELEPHONE (OUTPATIENT)
Dept: GYNECOLOGIC ONCOLOGY | Age: 40
End: 2020-01-20

## 2020-02-26 ENCOUNTER — TELEPHONE (OUTPATIENT)
Dept: ONCOLOGY | Age: 40
End: 2020-02-26

## 2023-01-20 NOTE — H&P
[address]      [address] [facility]     HISTORY AND PHYSICAL EXAMINATION            Date:   5/5/2019  Patient name:  Eulalio Arita  Date of admission:  5/5/2019 11:11 AM  MRN:   [id number]  Account:  [de-identified]  YOB: 1980  PCP:    No primary care provider on file. Room:   [address] [place] [address]  Code Status:    Full Code    Chief Complaint:     Chief Complaint   Patient presents with    Vaginal Bleeding     History Obtained From:     Patient    History of Present Illness: The patient is a 41 y.o. Non-/non  female who presents withVaginal Bleeding   and she is admitted to the hospital for further evaluation and symptomatic management. Patient presents with a history of weakness, fatigue and shortness of breath that has occurred for approximately 1 day. She has a history of menorrhagia in the past.    Her current menstrual period started on 5/1/2019. Her prior LMP was at the start of last month. Her menses occur at approximately 30 day intervals. Her first day is heavy, with approximately 8-10 pads soaked through followed by a lighter 1-2 pads/day for 2-3 days after. She had been on Depo for a few years where she did not have any periods. This period was different as she had filled an entire toilet bowl full of blood and she also had clots present. Menarche is at the age of 13. She denies any urinary symptoms, including dysuria, frequency, flank pain, suprapubic tenderness. Denies voiding any blood. No use of alcohol or regular NSAID usage. 1+ month ago she had a cough with fever that has not completely resolved. Patient notes that her grandmother had a history of a bleeding disorder, of which she had been tested in childhood and was found to be negative. Paternal history is positive for Skin Ca.    Patient denies any history of Problem: Falls - Risk of  Goal: *Absence of Falls  Description: Document Starleen Freeze Fall Risk and appropriate interventions in the flowsheet.   Outcome: Progressing Towards Goal  Note: Fall Risk Interventions:  Mobility Interventions: Bed/chair exit alarm    Mentation Interventions: Adequate sleep, hydration, pain control    Medication Interventions: Patient to call before getting OOB    Elimination Interventions: Call light in reach    History of Falls Interventions: Bed/chair exit alarm smoking/exposure, last alcoholic drink was 1+ years ago and denies use of any drugs. Patient denies any history of STI's. Past Medical History:     History reviewed. No pertinent past medical history. Past SurgicalHistory:     Past Surgical History:   Procedure Laterality Date    DILATION AND CURETTAGE OF UTERUS      x2        Medications Prior to Admission:        Prior to Admission medications    Medication Sig Start Date End Date Taking? Authorizing Provider   cephALEXin (KEFLEX) 500 MG capsule Take 1 capsule by mouth 3 times daily. 12/2/14   Chanel Mann MD        Allergies:     Patient has no known allergies. Social History:     Tobacco:    reports that she has never smoked. She has never used smokeless tobacco.  Alcohol:      reports that she drinks alcohol. Drug Use:  reports that she does not use drugs. Family History:     History reviewed. No pertinent family history. Review of Systems:     Positive and Negative as described in HPI. Review of Systems   Constitutional: Negative for chills, fatigue and fever. HENT: Negative. Eyes: Negative. Respiratory: Negative for cough, shortness of breath and wheezing. Cardiovascular: Negative for chest pain, palpitations and leg swelling. Gastrointestinal: Negative. Negative for constipation, diarrhea, nausea and vomiting. Genitourinary: Positive for vaginal bleeding. Negative for decreased urine volume, difficulty urinating, enuresis, flank pain, hematuria, vaginal discharge and vaginal pain. Musculoskeletal: Negative. Negative for arthralgias, back pain and myalgias. Skin: Negative. Negative for rash. Neurological: Positive for weakness and light-headedness. Negative for seizures, speech difficulty and headaches. Psychiatric/Behavioral: Negative. Negative for confusion and decreased concentration. The patient is not nervous/anxious.         Physical Exam:   /60   Pulse 89   Temp 98 °F (36.7 °C) (Oral)   Resp INTERNAL MEDICINE  IP CONSULT TO SOCIAL WORK  IP CONSULT TO ONCOLOGY    Patient is admitted as inpatient status because of co-morbiditieslisted above, severity of signs and symptoms as outlined, requirement for current medical therapies and most importantly because of direct risk to patient if care not provided in a hospital setting.     Zi Noble MD  5/5/2019  4:33 PM

## (undated) DEVICE — AIRSEAL 12 MM ACCESS PORT AND PALM GRIP OBTURATOR WITH BLADELESS OPTICAL TIP, 120 MM LENGTH: Brand: AIRSEAL

## (undated) DEVICE — 3M™ STERI-STRIP™ COMPOUND BENZOIN TINCTURE 40 BAGS/CARTON 4 CARTONS/CASE C1544: Brand: 3M™ STERI-STRIP™

## (undated) DEVICE — SYRINGE, LUER LOCK, 10ML: Brand: MEDLINE

## (undated) DEVICE — NEEDLE INSUF L150MM DIA2MM DISP FOR PNEUMOPERI ENDOPATH

## (undated) DEVICE — SUTURE MCRYL SZ 4-0 L18IN ABSRB UD L16MM PC-3 3/8 CIR PRIM Y845G

## (undated) DEVICE — GLOVE SURG SZ 8 L12IN FNGR THK79MIL GRN LTX FREE

## (undated) DEVICE — PAD,NON-ADHERENT,3X8,STERILE,LF,1/PK: Brand: MEDLINE

## (undated) DEVICE — SOLUTION ANTIFOG VIS SYS CLEARIFY LAPSCP

## (undated) DEVICE — DRAPE,REIN 53X77,STERILE: Brand: MEDLINE

## (undated) DEVICE — SUTURE MCRYL 2-0 L36IN ABSRB VLT CT-1 L36MM 1/2 CIR Y345H

## (undated) DEVICE — SUTURE VCRL SZ 0 L27IN ABSRB UD L26MM CT-2 1/2 CIR J270H

## (undated) DEVICE — COUNTER NDL 10 COUNT HLD 20 FOAM BLK SGL MAG

## (undated) DEVICE — TRI-LUMEN FILTERED TUBE SET WITH ACTIVATED CHARCOAL FILTER: Brand: AIRSEAL

## (undated) DEVICE — TROCAR ENDOSCP L150MM DIA5MM BLDELSS STBL SL CAM PRT W/

## (undated) DEVICE — TROCAR ENDOSCP L100MM DIA5MM BLDELSS STBL SL THRD OPT VW

## (undated) DEVICE — CONNECTOR,TUBING,5-IN-1,NON-STERILE: Brand: MEDLINE INDUSTRIES, INC.

## (undated) DEVICE — PROTECTOR ULN NRV PUR FOAM HK LOOP STRP ANATOMICALLY

## (undated) DEVICE — 6 ML SYRINGE LUER-LOCK TIP: Brand: MONOJECT

## (undated) DEVICE — MARKER,SKIN,WI/RULER AND LABELS: Brand: MEDLINE

## (undated) DEVICE — GLOVE SURG SZ 6 THK91MIL LTX FREE SYN POLYISOPRENE ANTI

## (undated) DEVICE — 40580 - THE PINK PAD - ADVANCED TRENDELENBURG POSITIONING KIT: Brand: 40580 - THE PINK PAD - ADVANCED TRENDELENBURG POSITIONING KIT

## (undated) DEVICE — SKIN AFFIX SURG ADHESIVE 72/CS 0.55ML: Brand: MEDLINE

## (undated) DEVICE — TOTAL TRAY, 16FR 10ML SIL FOLEY, URN: Brand: MEDLINE

## (undated) DEVICE — SUTURE VCRL SZ 1 L36IN ABSRB UD L36MM CT-1 1/2 CIR J947H

## (undated) DEVICE — SUTURE VCRL SZ 4-0 L18IN ABSRB UD L19MM PS-2 3/8 CIR PRIM J496H

## (undated) DEVICE — PREP SOL PVP IODINE 4%  4 OZ/BTL

## (undated) DEVICE — SUTURE PDS II SZ 0 L27IN ABSRB VLT L36MM CT-1 1/2 CIR Z340H

## (undated) DEVICE — ELECTROSURGICAL PENCIL BUTTON SWITCH E-Z CLEAN COATED BLADE ELECTRODE 10 FT (3 M) CORD HOLSTER: Brand: MEGADYNE

## (undated) DEVICE — Z DUPLICATE USE 2624755 KIT NEG PRSS DSG W/ PRSS INDIC PTCH STRP 45ML CANSTR CARR

## (undated) DEVICE — CANNULA SEAL

## (undated) DEVICE — SUTURE PDS II SZ 1 L36IN ABSRB VLT L36MM CT-1 1/2 CIR Z347H

## (undated) DEVICE — SUTURE PLN GUT SZ 2-0 L27IN ABSRB YELLOWISH TAN L70MM XLH 53T

## (undated) DEVICE — SUTURE PROL SZ 1 L30IN NONABSORBABLE BLU L36MM CT-1 1/2 CIR 8425H

## (undated) DEVICE — BLADELESS OBTURATOR: Brand: WECK VISTA

## (undated) DEVICE — TOWEL,OR,DSP,ST,NATURAL,DLX,4/PK,20PK/CS: Brand: MEDLINE

## (undated) DEVICE — STRIP SKIN CLSR W0.25XL4IN WHT SPUNBOUND FBR NYL HI ADH

## (undated) DEVICE — BLADELESS OBTURATOR, LONG: Brand: WECK VISTA

## (undated) DEVICE — SOLUTION SCRB 4OZ 4% CHG H2O AIDED FOR PREOPERATIVE SKIN

## (undated) DEVICE — DEVICE TRCR 12X9X3IN WHT CLSR DISP OMNICLOSE

## (undated) DEVICE — CONTAINER,SPECIMEN,4OZ,OR STRL: Brand: MEDLINE

## (undated) DEVICE — SVMMC GYN ROBOTIC PK

## (undated) DEVICE — INSUFFLATION TUBING SET WITH FILTER, FUNNEL CONNECTOR AND LUER LOCK: Brand: JOSNOE MEDICAL INC

## (undated) DEVICE — TRAP,MUCUS SPECIMEN, 80CC: Brand: MEDLINE

## (undated) DEVICE — GOWN,AURORA,NONRNF,XL,30/CS: Brand: MEDLINE

## (undated) DEVICE — ARM DRAPE

## (undated) DEVICE — 3M™ STERI-STRIP™ REINFORCED ADHESIVE SKIN CLOSURES, R1546, 1/4 IN X 4 IN (6 MM X 100 MM), 10 STRIPS/ENVELOPE: Brand: 3M™ STERI-STRIP™

## (undated) DEVICE — NEEDLE SPINAL 22GA L3.5IN SPINOCAN

## (undated) DEVICE — SCISSOR SURG METZ CRV TIP

## (undated) DEVICE — Device: Brand: UTERINE ELEVATOR PRO

## (undated) DEVICE — SYRINGE,EAR/ULCER, 2 OZ, STERILE: Brand: MEDLINE

## (undated) DEVICE — NEEDLE INSUF L120MM DIA2MM DISP FOR PNEUMOPERI ENDOPATH

## (undated) DEVICE — BAG SPEC LAP 9X7.5 IN 12 MM 1500 CC MEM WIRE CANN NYL

## (undated) DEVICE — CHLORAPREP 26ML ORANGE

## (undated) DEVICE — COVER OR TBL W40XL90IN ABSRB STD AND GRIPPY BK SAHARA

## (undated) DEVICE — GOWN,AURORA,NONREINFORCED,LARGE: Brand: MEDLINE

## (undated) DEVICE — 3M™ IOBAN™ 2 ANTIMICROBIAL INCISE DRAPE 6650EZ: Brand: IOBAN™ 2

## (undated) DEVICE — ELECTRO LUBE IS A SINGLE PATIENT USE DEVICE THAT IS INTENDED TO BE USED ON ELECTROSURGICAL ELECTRODES TO REDUCE STICKING.: Brand: KEY SURGICAL ELECTRO LUBE

## (undated) DEVICE — COVER,LIGHT HANDLE,FLX,2/PK: Brand: MEDLINE INDUSTRIES, INC.

## (undated) DEVICE — GLOVE SURG SZ 65 THK91MIL LTX FREE SYN POLYISOPRENE

## (undated) DEVICE — GLOVE ORANGE PI 7   MSG9070

## (undated) DEVICE — TRAP SPEC COLL 40CC MUCOUS CALIB UNIV CONN FOR OPN SUCT

## (undated) DEVICE — TIP COVER ACCESSORY